# Patient Record
Sex: FEMALE | Race: OTHER
[De-identification: names, ages, dates, MRNs, and addresses within clinical notes are randomized per-mention and may not be internally consistent; named-entity substitution may affect disease eponyms.]

---

## 2021-01-06 ENCOUNTER — HOSPITAL ENCOUNTER (OUTPATIENT)
Age: 56
End: 2021-01-06
Payer: COMMERCIAL

## 2021-01-06 DIAGNOSIS — Z20.828: Primary | ICD-10-CM

## 2021-01-06 PROCEDURE — U0002 COVID-19 LAB TEST NON-CDC: HCPCS

## 2021-01-06 PROCEDURE — 87635 SARS-COV-2 COVID-19 AMP PRB: CPT

## 2021-01-06 PROCEDURE — 87633 RESP VIRUS 12-25 TARGETS: CPT

## 2021-01-07 ENCOUNTER — HOSPITAL ENCOUNTER (OUTPATIENT)
Dept: HOSPITAL 100 - MS2OUT | Age: 56
Discharge: HOME | End: 2021-01-07
Payer: COMMERCIAL

## 2021-01-07 VITALS — BODY MASS INDEX: 24.2 KG/M2

## 2021-01-07 VITALS
TEMPERATURE: 99.2 F | OXYGEN SATURATION: 97 % | HEART RATE: 112 BPM | SYSTOLIC BLOOD PRESSURE: 126 MMHG | DIASTOLIC BLOOD PRESSURE: 72 MMHG | RESPIRATION RATE: 20 BRPM

## 2021-01-07 VITALS
SYSTOLIC BLOOD PRESSURE: 129 MMHG | HEART RATE: 110 BPM | DIASTOLIC BLOOD PRESSURE: 77 MMHG | OXYGEN SATURATION: 99 % | TEMPERATURE: 97.88 F | RESPIRATION RATE: 16 BRPM

## 2021-01-07 VITALS
RESPIRATION RATE: 20 BRPM | HEART RATE: 112 BPM | OXYGEN SATURATION: 98 % | SYSTOLIC BLOOD PRESSURE: 136 MMHG | TEMPERATURE: 99.32 F | DIASTOLIC BLOOD PRESSURE: 71 MMHG

## 2021-01-07 VITALS
RESPIRATION RATE: 18 BRPM | DIASTOLIC BLOOD PRESSURE: 80 MMHG | HEART RATE: 110 BPM | OXYGEN SATURATION: 98 % | SYSTOLIC BLOOD PRESSURE: 141 MMHG | TEMPERATURE: 99.14 F

## 2021-01-07 VITALS — TEMPERATURE: 99.5 F

## 2021-01-07 VITALS
HEART RATE: 115 BPM | TEMPERATURE: 100.04 F | OXYGEN SATURATION: 99 % | DIASTOLIC BLOOD PRESSURE: 79 MMHG | RESPIRATION RATE: 20 BRPM | SYSTOLIC BLOOD PRESSURE: 142 MMHG

## 2021-01-07 DIAGNOSIS — U07.1: Primary | ICD-10-CM

## 2021-01-07 DIAGNOSIS — E11.9: ICD-10-CM

## 2021-01-07 PROCEDURE — M0239 BAMLANIVIMAB-XXXX INFUSION: HCPCS

## 2021-01-07 PROCEDURE — 96365 THER/PROPH/DIAG IV INF INIT: CPT

## 2021-01-18 ENCOUNTER — HOSPITAL ENCOUNTER (OUTPATIENT)
Age: 56
End: 2021-01-18
Payer: COMMERCIAL

## 2021-01-18 VITALS — BODY MASS INDEX: 24.2 KG/M2

## 2021-01-18 DIAGNOSIS — U07.1: Primary | ICD-10-CM

## 2021-01-18 PROCEDURE — 87635 SARS-COV-2 COVID-19 AMP PRB: CPT

## 2021-01-18 PROCEDURE — U0002 COVID-19 LAB TEST NON-CDC: HCPCS

## 2021-02-11 ENCOUNTER — HOSPITAL ENCOUNTER (OUTPATIENT)
Age: 56
End: 2021-02-11
Payer: COMMERCIAL

## 2021-02-11 VITALS — BODY MASS INDEX: 24.2 KG/M2

## 2021-02-11 DIAGNOSIS — R19.7: Primary | ICD-10-CM

## 2021-02-11 PROCEDURE — 83630 LACTOFERRIN FECAL (QUAL): CPT

## 2021-02-11 PROCEDURE — 87209 SMEAR COMPLEX STAIN: CPT

## 2021-02-11 PROCEDURE — 87493 C DIFF AMPLIFIED PROBE: CPT

## 2021-02-11 PROCEDURE — 87506 IADNA-DNA/RNA PROBE TQ 6-11: CPT

## 2021-02-11 PROCEDURE — 82274 ASSAY TEST FOR BLOOD FECAL: CPT

## 2021-02-11 PROCEDURE — 87177 OVA AND PARASITES SMEARS: CPT

## 2021-05-19 ENCOUNTER — HOSPITAL ENCOUNTER (OUTPATIENT)
Age: 56
End: 2021-05-19
Payer: COMMERCIAL

## 2021-05-19 VITALS — BODY MASS INDEX: 24.2 KG/M2

## 2021-05-19 DIAGNOSIS — M79.609: Primary | ICD-10-CM

## 2021-05-19 PROCEDURE — 73130 X-RAY EXAM OF HAND: CPT

## 2023-02-25 RX ORDER — SITAGLIPTIN AND METFORMIN HYDROCHLORIDE 50; 500 MG/1; MG/1
TABLET, FILM COATED, EXTENDED RELEASE ORAL DAILY
COMMUNITY
End: 2023-04-10 | Stop reason: SINTOL

## 2023-02-25 RX ORDER — LOSARTAN POTASSIUM 50 MG/1
50 TABLET ORAL DAILY
COMMUNITY
End: 2023-07-31 | Stop reason: DRUGHIGH

## 2023-02-25 RX ORDER — DAPAGLIFLOZIN 10 MG/1
TABLET, FILM COATED ORAL EVERY MORNING
COMMUNITY
End: 2024-03-11 | Stop reason: ALTCHOICE

## 2023-02-25 RX ORDER — INSULIN GLARGINE 300 U/ML
14 INJECTION, SOLUTION SUBCUTANEOUS NIGHTLY
COMMUNITY

## 2023-02-25 RX ORDER — ROSUVASTATIN CALCIUM 5 MG/1
5 TABLET, COATED ORAL DAILY
COMMUNITY
End: 2023-07-31 | Stop reason: DRUGHIGH

## 2023-02-25 RX ORDER — METOPROLOL SUCCINATE 50 MG/1
50 TABLET, EXTENDED RELEASE ORAL DAILY
COMMUNITY
End: 2023-07-31 | Stop reason: SINTOL

## 2023-03-27 ENCOUNTER — APPOINTMENT (OUTPATIENT)
Dept: PRIMARY CARE | Facility: CLINIC | Age: 58
End: 2023-03-27
Payer: COMMERCIAL

## 2023-03-29 ENCOUNTER — TELEPHONE (OUTPATIENT)
Dept: PRIMARY CARE | Facility: CLINIC | Age: 58
End: 2023-03-29

## 2023-04-10 ENCOUNTER — OFFICE VISIT (OUTPATIENT)
Dept: PRIMARY CARE | Facility: CLINIC | Age: 58
End: 2023-04-10
Payer: COMMERCIAL

## 2023-04-10 VITALS
DIASTOLIC BLOOD PRESSURE: 77 MMHG | BODY MASS INDEX: 26.65 KG/M2 | HEART RATE: 76 BPM | HEIGHT: 66 IN | SYSTOLIC BLOOD PRESSURE: 137 MMHG | RESPIRATION RATE: 18 BRPM | WEIGHT: 165.8 LBS

## 2023-04-10 DIAGNOSIS — Z79.899 MEDICATION DOSE CHANGED: ICD-10-CM

## 2023-04-10 DIAGNOSIS — E11.65 TYPE 2 DIABETES MELLITUS WITH HYPERGLYCEMIA, WITH LONG-TERM CURRENT USE OF INSULIN (MULTI): ICD-10-CM

## 2023-04-10 DIAGNOSIS — Z79.4 INSULIN-REQUIRING OR DEPENDENT TYPE II DIABETES MELLITUS (MULTI): Primary | ICD-10-CM

## 2023-04-10 DIAGNOSIS — E11.69 HYPERLIPIDEMIA ASSOCIATED WITH TYPE 2 DIABETES MELLITUS (MULTI): ICD-10-CM

## 2023-04-10 DIAGNOSIS — E11.9 INSULIN-REQUIRING OR DEPENDENT TYPE II DIABETES MELLITUS (MULTI): Primary | ICD-10-CM

## 2023-04-10 DIAGNOSIS — Z79.4 TYPE 2 DIABETES MELLITUS WITH HYPERGLYCEMIA, WITH LONG-TERM CURRENT USE OF INSULIN (MULTI): ICD-10-CM

## 2023-04-10 DIAGNOSIS — E78.5 HYPERLIPIDEMIA ASSOCIATED WITH TYPE 2 DIABETES MELLITUS (MULTI): ICD-10-CM

## 2023-04-10 DIAGNOSIS — E27.8 ADRENAL NODULE (MULTI): ICD-10-CM

## 2023-04-10 DIAGNOSIS — E11.319 DIABETIC RETINOPATHY ASSOCIATED WITH TYPE 2 DIABETES MELLITUS, MACULAR EDEMA PRESENCE UNSPECIFIED, UNSPECIFIED LATERALITY, UNSPECIFIED RETINOPATHY SEVERITY (MULTI): ICD-10-CM

## 2023-04-10 DIAGNOSIS — R79.9 ELEVATED BUN: ICD-10-CM

## 2023-04-10 DIAGNOSIS — E55.9 VITAMIN D INSUFFICIENCY: ICD-10-CM

## 2023-04-10 PROCEDURE — 99215 OFFICE O/P EST HI 40 MIN: CPT | Performed by: INTERNAL MEDICINE

## 2023-04-10 PROCEDURE — 3075F SYST BP GE 130 - 139MM HG: CPT | Performed by: INTERNAL MEDICINE

## 2023-04-10 PROCEDURE — 1036F TOBACCO NON-USER: CPT | Performed by: INTERNAL MEDICINE

## 2023-04-10 PROCEDURE — 3078F DIAST BP <80 MM HG: CPT | Performed by: INTERNAL MEDICINE

## 2023-04-10 PROCEDURE — 4010F ACE/ARB THERAPY RXD/TAKEN: CPT | Performed by: INTERNAL MEDICINE

## 2023-04-10 RX ORDER — SITAGLIPTIN AND METFORMIN HYDROCHLORIDE 500; 50 MG/1; MG/1
1 TABLET, FILM COATED ORAL
Qty: 60 TABLET | Refills: 2 | Status: SHIPPED | OUTPATIENT
Start: 2023-04-10 | End: 2023-07-31 | Stop reason: SINTOL

## 2023-04-10 RX ORDER — BLOOD-GLUCOSE METER
EACH MISCELLANEOUS
COMMUNITY
Start: 2023-01-17

## 2023-04-10 RX ORDER — LANCETS 33 GAUGE
EACH MISCELLANEOUS
COMMUNITY
Start: 2023-01-14

## 2023-04-10 RX ORDER — BLOOD-GLUCOSE SENSOR
EACH MISCELLANEOUS
COMMUNITY
Start: 2023-04-01

## 2023-04-10 RX ORDER — DEXAMETHASONE 1 MG/1
TABLET ORAL
COMMUNITY
Start: 2023-01-19 | End: 2023-04-10 | Stop reason: ALTCHOICE

## 2023-04-10 SDOH — HEALTH STABILITY: PHYSICAL HEALTH: ON AVERAGE, HOW MANY DAYS PER WEEK DO YOU ENGAGE IN MODERATE TO STRENUOUS EXERCISE (LIKE A BRISK WALK)?: 4 DAYS

## 2023-04-10 SDOH — ECONOMIC STABILITY: FOOD INSECURITY: WITHIN THE PAST 12 MONTHS, THE FOOD YOU BOUGHT JUST DIDN'T LAST AND YOU DIDN'T HAVE MONEY TO GET MORE.: NEVER TRUE

## 2023-04-10 SDOH — HEALTH STABILITY: PHYSICAL HEALTH: ON AVERAGE, HOW MANY MINUTES DO YOU ENGAGE IN EXERCISE AT THIS LEVEL?: 60 MIN

## 2023-04-10 SDOH — ECONOMIC STABILITY: HOUSING INSECURITY
IN THE LAST 12 MONTHS, WAS THERE A TIME WHEN YOU DID NOT HAVE A STEADY PLACE TO SLEEP OR SLEPT IN A SHELTER (INCLUDING NOW)?: NO

## 2023-04-10 SDOH — ECONOMIC STABILITY: TRANSPORTATION INSECURITY
IN THE PAST 12 MONTHS, HAS THE LACK OF TRANSPORTATION KEPT YOU FROM MEDICAL APPOINTMENTS OR FROM GETTING MEDICATIONS?: NO

## 2023-04-10 SDOH — ECONOMIC STABILITY: FOOD INSECURITY: WITHIN THE PAST 12 MONTHS, YOU WORRIED THAT YOUR FOOD WOULD RUN OUT BEFORE YOU GOT MONEY TO BUY MORE.: NEVER TRUE

## 2023-04-10 SDOH — ECONOMIC STABILITY: INCOME INSECURITY: IN THE LAST 12 MONTHS, WAS THERE A TIME WHEN YOU WERE NOT ABLE TO PAY THE MORTGAGE OR RENT ON TIME?: NO

## 2023-04-10 SDOH — ECONOMIC STABILITY: HOUSING INSECURITY: IN THE LAST 12 MONTHS, HOW MANY PLACES HAVE YOU LIVED?: 1

## 2023-04-10 SDOH — ECONOMIC STABILITY: TRANSPORTATION INSECURITY
IN THE PAST 12 MONTHS, HAS LACK OF TRANSPORTATION KEPT YOU FROM MEETINGS, WORK, OR FROM GETTING THINGS NEEDED FOR DAILY LIVING?: NO

## 2023-04-10 ASSESSMENT — SOCIAL DETERMINANTS OF HEALTH (SDOH)
DO YOU BELONG TO ANY CLUBS OR ORGANIZATIONS SUCH AS CHURCH GROUPS UNIONS, FRATERNAL OR ATHLETIC GROUPS, OR SCHOOL GROUPS?: YES
WITHIN THE LAST YEAR, HAVE YOU BEEN KICKED, HIT, SLAPPED, OR OTHERWISE PHYSICALLY HURT BY YOUR PARTNER OR EX-PARTNER?: NO
WITHIN THE LAST YEAR, HAVE YOU BEEN AFRAID OF YOUR PARTNER OR EX-PARTNER?: NO
HOW OFTEN DO YOU ATTENT MEETINGS OF THE CLUB OR ORGANIZATION YOU BELONG TO?: 1 TO 4 TIMES PER YEAR
WITHIN THE LAST YEAR, HAVE YOU BEEN HUMILIATED OR EMOTIONALLY ABUSED IN OTHER WAYS BY YOUR PARTNER OR EX-PARTNER?: NO
HOW OFTEN DO YOU ATTEND CHURCH OR RELIGIOUS SERVICES?: MORE THAN 4 TIMES PER YEAR
HOW HARD IS IT FOR YOU TO PAY FOR THE VERY BASICS LIKE FOOD, HOUSING, MEDICAL CARE, AND HEATING?: NOT HARD AT ALL
IN A TYPICAL WEEK, HOW MANY TIMES DO YOU TALK ON THE PHONE WITH FAMILY, FRIENDS, OR NEIGHBORS?: TWICE A WEEK
WITHIN THE LAST YEAR, HAVE TO BEEN RAPED OR FORCED TO HAVE ANY KIND OF SEXUAL ACTIVITY BY YOUR PARTNER OR EX-PARTNER?: NO
HOW OFTEN DO YOU GET TOGETHER WITH FRIENDS OR RELATIVES?: ONCE A WEEK

## 2023-04-10 ASSESSMENT — LIFESTYLE VARIABLES
HOW OFTEN DURING THE LAST YEAR HAVE YOU FAILED TO DO WHAT WAS NORMALLY EXPECTED FROM YOU BECAUSE OF DRINKING: NEVER
HOW OFTEN DURING THE LAST YEAR HAVE YOU HAD A FEELING OF GUILT OR REMORSE AFTER DRINKING: NEVER
SKIP TO QUESTIONS 9-10: 1
HAVE YOU OR SOMEONE ELSE BEEN INJURED AS A RESULT OF YOUR DRINKING: NO
HAS A RELATIVE, FRIEND, DOCTOR, OR ANOTHER HEALTH PROFESSIONAL EXPRESSED CONCERN ABOUT YOUR DRINKING OR SUGGESTED YOU CUT DOWN: NO
HOW OFTEN DURING THE LAST YEAR HAVE YOU BEEN UNABLE TO REMEMBER WHAT HAPPENED THE NIGHT BEFORE BECAUSE YOU HAD BEEN DRINKING: NEVER
AUDIT TOTAL SCORE: 1
HOW OFTEN DO YOU HAVE SIX OR MORE DRINKS ON ONE OCCASION: NEVER
HOW OFTEN DURING THE LAST YEAR HAVE YOU NEEDED AN ALCOHOLIC DRINK FIRST THING IN THE MORNING TO GET YOURSELF GOING AFTER A NIGHT OF HEAVY DRINKING: NEVER
HOW OFTEN DO YOU HAVE A DRINK CONTAINING ALCOHOL: MONTHLY OR LESS
HOW OFTEN DURING THE LAST YEAR HAVE YOU FOUND THAT YOU WERE NOT ABLE TO STOP DRINKING ONCE YOU HAD STARTED: NEVER
AUDIT-C TOTAL SCORE: 1
HOW MANY STANDARD DRINKS CONTAINING ALCOHOL DO YOU HAVE ON A TYPICAL DAY: 1 OR 2

## 2023-04-10 ASSESSMENT — PATIENT HEALTH QUESTIONNAIRE - PHQ9
1. LITTLE INTEREST OR PLEASURE IN DOING THINGS: NOT AT ALL
SUM OF ALL RESPONSES TO PHQ9 QUESTIONS 1 & 2: 0
2. FEELING DOWN, DEPRESSED OR HOPELESS: NOT AT ALL

## 2023-04-10 NOTE — PATIENT INSTRUCTIONS
Change janumet xr to janumet plain, 50mg-500mg one twice per day with food. If tolerated, after a month would consider increasing to janumet 50mg-1000 mg alternating with 50mg-500mg twice per day with food.  If this medication, at  dose you tolerate, does not get the a1c/sugars down, then suggest stopping it and switching to Trulicity.    Continue the Farxiga and the insulin at the reduced dose.    CT cardiac in June as planned.  Recommend aldosterone level and overnight dexamethasone suppression test at your convenience.    Call if issues with meds above and consideration with trying trulicity.    Staying hydrated is important for many bodily functions. Generally consuming 48-64 ounces of water per day is recommended.    Follow up in 3 months.  Message me about issues with plan above or when you need the 50mg-1000mg janumet sent in.

## 2023-04-10 NOTE — PROGRESS NOTES
Subjective   Patient ID: Allyson Garcia is a 58 y.o. female who presents for Follow-up (Recheck B/P, Rx's, dexamethasone supp test review (did not obtain), mammogram review(did not obtain), CT adrenal review (done at [facility] 31 March 2023.)/).  LAST VISIT PLAN 2/20/23  Patient Discussion/Summary    MEDICATIONS:  Trial Janumet 50-500mg one per day with food.  if tolerated, after 2 weeks increase to janumet 100mg-1000mg daily.    Continue farxiga  Continue insulin  Watch sugars for lows when on the janumet.    INSTRUCTIONS:  You are encouraged to drink 64 oz of water per day.     A diet that is low in sugars, refined grains and processed foods is recommended.  we talked about protein before or with carbs simultaneous at a meal, avoid carb without a protein     Exercise at least 30 minutes per day is also recommended.    Get a dexcom G7 set up to track sugars.    TESTING:fasting blood test today.    overnight dexamethasone suppression test in a week or so  consider b12 injection in future.    mammogram [facility] in the next couple months    CT adrenal gland in march  ct cardiac soon  REFERRALS:keep eye appts.    FOLLOW UP WITH DR. IVEY:  Next visit:1-2 months.  will re discuss bp meds then, consider increasing losartan to 100mg.  Provider Impressions    Delightful nephrologist with mutliple medical issues:    htn-bp not at goal here today but is stressful today, better at home, no med changes. consider increasing losartan but hold off for now    dm2 with hld, hyperglycemia a1c 8.0 and retinopathy and fearful of vision loss-ck labs. add janumet and see if can tolerate some metformin with januvia.  consider trulicity, had s/e other glp agonists.  continue farxiga.  recommend dexcom 6 (or 7)   declined dietician. discussed how to avoid sugar spikes, eat protein first, then carb. always protein with carbs    stress depression, reminded to follow up psychology, note reviewed with permission  consider meds  "?wellbutrin?? or ssri-briefly discussed: \"my  says i should\" agreed to revisit.  call if needs name change on rx to Jack    hld is on statin    adrenal nodule: ct with and without contrast never done so ordered now, she said she would do in march.assuming it is still present she needs a full work up: aldosterone, renins catecholamines etc.   she requested over night dex supp test, we did not discuss the other labs yet.    hx covid, likely aggravated her dm2 (dm2 since 2014 but worse more recent).  she feels fully recovered, but    abnl lung ct: had extensive ground glass opacities on ct when had covid pneumonia (she was hypoxic at home ,d dimer up then).-discussed follow up ct chest vs do ct for cac and she opted for the latter.  Discussed with the patient the pros and cons of doing a ct for cardiac calcium scoring. Pros are to look for possible plaque and this picks up calcified plaque, but not the less common non calcified plaque. It can breed other tests if lung nodules or liver nodules are found and need further eval. This test is not covered by insurance but any testing needed for findings noted on the ct cardiac would be billable to insurance. Radiation is low dose, there is no contrast. It can  other helpful findings such as thoracic aneurysm.    radicular pain left arm-neg adsons test. no meds rxd for this today. some left leg too? neuropathy?neck? other. no weakness.  comes and goes.  addendum : labs in   a1c 7.7  lipids reasonably good  cmp ok  cbc ok  tsh and cortisol nl. she requested overnight dex supp test.   [facility] re labs are in, did not eave details.    current phone number in chart is not correct. i used a cell number i had for her  END INFO FROM PRIOR VISIT  HPI  Here for follow up on dm2 insulin requiring and labs.  Dexcom sugars are still running high.  If goes out to eat or eats ramen, or rice.  Reviewed labs. Only pending one is dex supp test overnight, she has the decadron, " just has not been able to do the test.  Looked at her glucose monitor, see below.  Scribe Transcription:  She reports having frequent constipation due to lack of hydration. She admits she has to drink more water.   She states she doesn’t like drinking water.    She reports GI upset on GLP1A. She still has her gallbladder.     Her avg glucose 30 days is 182. 14 days, 187; 7 days, 179. 56% in range. No low glucose events. Her very highs are 5 to 10% of the time. 39% high.       Review of Systems    Objective   Feb 2023:  Cbc nl  Cmp ok other than mild dehydration, bun 30  Chol 196, trig 166, ldl 105  Am cortisol ok at 12.7, has not done over night dex test yet. Has meds.  , tsh ok, b12 nl. Vit d low. A1c7.7% which was better than she thought it would be.    DATE OF EXAM: Mar 31 2023  9:32AM      [facility] [place]  -  CT ADRENAL WO/W IVCON  / ACCESSION #  [id number]    PROCEDURE REASON: E27.8  ADRENAL NODULE        * * * * Physician Interpretation * * * *     CT ABDOMEN / ADRENAL GLANDS WITHOUT AND WITH IV CONTRAST    CLINICAL HISTORY: Left adrenal nodule  TECHNIQUE: Thin section spiral imaging through the adrenal glands was  performed without and with contrast using washout technique. Oral  contrast is not administered per protocol.    Contrast:  IV:  100 ml of Omnipaque 350  Oral Contrast: None    CT Radiation dose: Integrated Dose-length product (DLP) for this visit =    500 mGy*cm.  CT Dose Reduction Employed: Automated exposure control(AEC) and iterative  recon    COMPARISON: CT scans of the chest of 01/11/2021    RESULT:    Adrenal glands:    Right adrenal gland: No nodules, masses or thickening.    Left adrenal gland: Low density left adrenal nodule with peripheral  calcification measures 2.5 x 2.5 cm.  Slightly higher than fluid density.   No enhancement.  This is stable in size and appearance for greater than  2 years    Abdomen  Liver: No mass.    Biliary: No bile duct dilation.  The gallbladder is  "unremarkable    Spleen: No mass. No splenomegaly.    Pancreas: No mass or duct dilation.    Kidneys: 1 cm right renal angiomyolipoma.  No suspicious renal lesion or  gross obstructive uropathy.    GI tract: No dilation or wall thickening.    Lymph nodes: No abdominal lymphadenopathy.    Mesentery/Peritoneum: No ascites or mass.    Vasculature:  The celiac axis and SMA are patent. The portal vein and  branches, splenic vein, SMV, and hepatic veins are patent.  The aorta is  normal in caliber    Bones/Soft Tissues: No significant finding.    Lower Thorax: Unremarkable.    Exam End: --     Physical ExamBP 137/77   Pulse 76   Resp 18   Ht 1.676 m (5' 6\")   Wt 75.2 kg (165 lb 12.8 oz)   BMI 26.76 kg/m²   Patient looks well and is in no obvious distress.  HEENT:   Normocephalic, no facial asymmetry  Eyes: Sclera and conjunctiva are clear.  Neck: No adenopathy cervical (Ant/post/lat), no Supraclavicular nodes.   Thyroid normal.   Carotid pulses normal, no carotid bruits.  Lungs : RR normal. Clear to auscultation anterior, posterior and lateral. No rales, wheezes rhonchi or rubs. Good air exchange.  Heart: RRR. No Murmur, gallop, click or rub.  Abdomen: Bowel sounds normal, No bruits. No pulsatile mass. No hepatosplenomegaly, masses or tenderness. Soft, no guarding.  Vascular:  Posterior tibialis and dorsalis pedis pulses within normal limits bilaterally.   Extremities: no upper extremity edema. No lower extremity edema.   Musculoskeletal: no synovitis of joints seen. No new deformity.  Neuro: CN 2-12 intact. Alert, appropriate.   Ambulates independently.  No gross motor deficit.   No tremors.  Psych: normal mood and affect.  Skin: No rash, bruising petechiae or jaundice.    Monofilament testing normal  Foot exam normal, skin normal no callus          Assessment/Plan   Problem List Items Addressed This Visit       Type 2 diabetes mellitus with hyperglycemia, with long-term current use of insulin (CMS/[facility])  Continue " farxiga and insulin -hx of Rybelsus gi side effect and ozempic gi side effect. Victoza gi side effect nausea. Bydureon gi side effect. Could still try trulicity or monjaro and recommend doing so.  Would need to change janumet to just metformin if we do this and she has hx of not tolerating even small doses of plain metformin. Gi s/e. She wants to try and maximize jannumet and I am ok with this but if does not work then need to try the trulicity or monjaro      Vitamin D insufficiency replace    Elevated BUN drink more water    Adrenal nodule (CMS/HCC)   needs work up metabolic .start with overnight dex supp test.  And should do aldosterone level.     Other Visit Diagnoses       Insulin-requiring or dependent type II diabetes mellitus (CMS/HCC)    -  Primary    Relevant Medications    Janumet  mg tablet    Hyperlipidemia associated with type 2 diabetes mellitus (CMS/HCC)       Should be on statin just because of dm and has retinopathy. Will do ct cardiac in June  Retinopathy dm-seeing retinal specialist.            It has been a pleasure seeing you.

## 2023-04-22 PROBLEM — R79.9 ELEVATED BUN: Status: ACTIVE | Noted: 2023-04-22

## 2023-04-22 PROBLEM — E11.65 TYPE 2 DIABETES MELLITUS WITH HYPERGLYCEMIA, WITH LONG-TERM CURRENT USE OF INSULIN (MULTI): Status: ACTIVE | Noted: 2023-04-22

## 2023-04-22 PROBLEM — Z79.4 TYPE 2 DIABETES MELLITUS WITH HYPERGLYCEMIA, WITH LONG-TERM CURRENT USE OF INSULIN (MULTI): Status: ACTIVE | Noted: 2023-04-22

## 2023-04-22 PROBLEM — E27.9 ADRENAL NODULE: Status: ACTIVE | Noted: 2023-04-22

## 2023-04-22 PROBLEM — E27.8 ADRENAL NODULE (MULTI): Status: ACTIVE | Noted: 2023-04-22

## 2023-04-22 PROBLEM — E55.9 VITAMIN D INSUFFICIENCY: Status: ACTIVE | Noted: 2023-04-22

## 2023-06-05 DIAGNOSIS — E78.5 HYPERLIPIDEMIA ASSOCIATED WITH TYPE 2 DIABETES MELLITUS (MULTI): ICD-10-CM

## 2023-06-05 DIAGNOSIS — E11.65 TYPE 2 DIABETES MELLITUS WITH HYPERGLYCEMIA, WITH LONG-TERM CURRENT USE OF INSULIN (MULTI): ICD-10-CM

## 2023-06-05 DIAGNOSIS — E11.69 HYPERLIPIDEMIA ASSOCIATED WITH TYPE 2 DIABETES MELLITUS (MULTI): ICD-10-CM

## 2023-06-05 DIAGNOSIS — Z79.4 TYPE 2 DIABETES MELLITUS WITH HYPERGLYCEMIA, WITH LONG-TERM CURRENT USE OF INSULIN (MULTI): ICD-10-CM

## 2023-06-05 DIAGNOSIS — R93.1 HIGH CORONARY ARTERY CALCIUM SCORE: Primary | ICD-10-CM

## 2023-06-05 DIAGNOSIS — E11.319 DIABETIC RETINOPATHY ASSOCIATED WITH TYPE 2 DIABETES MELLITUS, MACULAR EDEMA PRESENCE UNSPECIFIED, UNSPECIFIED LATERALITY, UNSPECIFIED RETINOPATHY SEVERITY (MULTI): ICD-10-CM

## 2023-06-05 DIAGNOSIS — I25.10 CORONARY ARTERY DISEASE INVOLVING NATIVE CORONARY ARTERY OF NATIVE HEART WITHOUT ANGINA PECTORIS: ICD-10-CM

## 2023-07-17 LAB
CALCIDIOL (25 OH VITAMIN D3) (NG/ML) IN SER/PLAS: 34 NG/ML
CORTISOL (UG/DL) IN SERUM - AM: 1.2 UG/DL (ref 5–20)
ESTIMATED AVERAGE GLUCOSE FOR HBA1C: 174 MG/DL
HEMOGLOBIN A1C/HEMOGLOBIN TOTAL IN BLOOD: 7.7 %

## 2023-07-21 NOTE — PROGRESS NOTES
"Subjective   Patient ID: Allyson Garcia is a 58 y.o. female who presents for Follow-up (Follow up Rx's 3 month./She stopped metoprolol and vit D./She's taking Janumet 50-1,000/\"I'm not taking Tujeo every day./[facility] [place] / [employer] [place]./RF rosuvastatin (10mg) to RA [place]./I cut lisinopril back to 25mg/d; I get dizzy with the 50mg.\"/mary).  LAST VISIT PLAN 4/10/2023  PATIENT'S DISCUSSION/SUMMARY:  Change janumet xr to janumet plain, 50mg-500mg one twice per day with food. If tolerated, after a month would consider increasing to janumet 50mg-1000 mg alternating with 50mg-500mg twice per day with food.  If this medication, at  dose you tolerate, does not get the a1c/sugars down, then suggest stopping it and switching to Trulicity.  Continue the Farxiga and the insulin at the reduced dose.  CT cardiac in June as planned.  Recommend aldosterone level and overnight dexamethasone suppression test at your convenience.  Call if issues with meds above and consideration with trying trulicity.  Staying hydrated is important for many bodily functions. Generally consuming 48-64 ounces of water per day is recommended.  Follow up in 3 months.  Message me about issues with plan above or when you need the 50mg-1000mg janumet sent in.      PROVIDER'S IMPRESSIONS:  Insulin-requiring or dependent type II diabetes mellitus (CMS/HCC)  Adrenal nodule (CMS/HCC)  Type 2 diabetes mellitus with hyperglycemia, with long-term current use of insulin (CMS/HCC)  Hyperlipidemia associated with type 2 diabetes mellitus (CMS/HCC)  Vitamin D insufficiency  Elevated BUN  Diabetic retinopathy associated with type 2 diabetes mellitus, macular edema presence unspecified, unspecified laterality, unspecified retinopathy severity (CMS/HCC)  Medication dose changed  Medication Changes  sitagliptin phos/metformin HCl  mg tablet, 1 tablet oral 2 times daily with meals   END INFO FROM PRIOR VISIT    HPI  [id number] requiring with " hyperglycemia here forfollow up.Meds are  janumet, farxiga and insulin.  She has retinopathy and mildly abnormal renal labs    This am had  toast with cheese and chicken salad and sugar in her instant coffee. Has dexcom.    Pt discontinued Her metoprolol and cut her losartan to 25mg daily due to low bp and feeling tired.    She is not taking toujaeo every day. Says she bottoms out to 100 sometimes.A1c is not at goal.    Early am sugars 190-200   She is Maxed out on metformin and januvia.dose/tolerability wise. Says she does not like metformin as is dangerous and she has seen acute renal failure in patients on metformin.Reviewed kdigo guidelines on metformin that recommend .keep it unless gfr below 30/  Recommend she see an endocrinologist. She declines. Seems not easy for her to do at her location.       Is fearful of trulicity. Had n/v/d ozempic and rybelsus but says that she is worred about slow gut from the diabetes.  Has never had egd.  Occ gets reflux if bloated.  Still has gb.  Bloated.    She does not want a hida scan to investigate gb, imaging of gb ok on adrenal gland ct.    Vitamin D: She was taking 1000iu international  d and level 34 on this  dose so she stopped it.recommend checking a level in the future as may be low if not taking it.    Scribe Transcription:  She reports feeling bloated when she eats normally and is hesitant to try Rybelsus. She states she doesn’t take Tujeo everyday and only takes it when she has a higher carb diet. She takes it at night to keep her morning sugars down. She reports taking her Tujeo 2-3 times per week.     She reports some acid reflux.     She sounds unhappy/frustrated with her illness today and she states she is tired of taking all her medications and feels like she is “chasing numbers.”     90 day glucose summary: 187 avg glucose, 48% in range, 51% high, less than 1% low.  30 day: 192 avg, 45% in range, no lows.   14 days: 191 avg glucose, 43% in range, 57% high, no  "lows.     Diabetes:Type II:Is taking medications regularly, denies side effects.  Denies hypoglycemia, polyuria, polydipsia.  No new numbness or paresthesias of extremities.No syncope or dizziness.  Lab Results   Component Value Date    HGBA1C 7.7 (A) 07/17/2023            We had a long discussion regarding taking her Tujeo daily because she has had no low sugar events. We discussed her desire too hold her metformin to avoid any potential hypoglycemic events.     She denies numbness and tingling.    None of her February labs ported over to Epic.     Scribe Attestation  By signing my name below, I, Kp Lin   attest that this documentation has been prepared under the direction and in the presence of Parris Knowles MD.     Review of Systems  Has elevated coronary art calcium score. She is not sure she needs to see cardio but will keep the appt.   Discussed crestor, is on 10mg will need labs on this.  No cardio resp c/o  Objective   Lab Results   Component Value Date    HGBA1C 7.7 (A) 07/17/2023     Physical ExamBP 126/87   Pulse 94   Ht 1.676 m (5' 6\")   Wt 73.9 kg (163 lb)   BMI 26.31 kg/m²       2/20/2023     8:17 AM 2/20/2023     9:53 AM 4/10/2023     4:25 PM 4/10/2023     4:30 PM 7/31/2023    10:06 AM 7/31/2023    10:09 AM   Vitals   Systolic 152 146 131 137 154 126   Diastolic 86 86 86 77 99 87   Heart Rate 64  90 76 99 94   Resp 18  18      Height (in) 1.683 m (5' 6.25\")  1.676 m (5' 6\")  1.676 m (5' 6\")    Weight (lb) 162  165.8  163    BMI 25.95 kg/m2  26.76 kg/m2  26.31 kg/m2    BSA (m2) 1.85 m2  1.87 m2  1.86 m2    Visit Report   Report Report Report Report   Usually bp 120-130 systolic and rarely 150.      Patient looks well and is in no obvious distress.  HEENT:   Normocephalic, no facial asymmetry  Eyes: Sclera and conjunctiva are clear.  Neck: No adenopathy cervical (Ant/post/lat), no Supraclavicular nodes.   Thyroid normal.  Carotid pulses normal, no carotid bruits.  Lungs : RR " normal. Clear to auscultation anterior, posterior and lateral. No rales, wheezes rhonchi or rubs. Good air exchange.  Heart: RRR. No Murmur, gallop, click or rub.  Abdomen: Bowel sounds normal, No bruits. No pulsatile mass. No hepatosplenomegaly, masses or tenderness. Soft, no guarding.  Vascular:  Posterior tibialis pulses within normal limits bilaterally.   Extremities: no upper extremity edema. No lower extremity edema.   Musculoskeletal: no synovitis of joints seen. No new deformity.  Neuro: CN 2-12 intact. Alert, appropriate.  Ambulates independently.  No gross motor deficit.   No tremors.  Psych: normal mood and affect.  Skin: No rash, bruising petechiae or jaundice.    Assessment/Plan   Problem List Items Addressed This Visit       Type 2 diabetes mellitus with hyperglycemia, with long-term current use of insulin (CMS/[facility])    Relevant Medications    metFORMIN (Glucophage) 1,000 mg tablet    dulaglutide (Trulicity) 0.75 mg/0.5 mL pen injector    Other Relevant Orders    Comprehensive metabolic panel    Lipid panel    Vitamin D insufficiency   pt stopped her vitamin d since her level was 34. Will michele in future as not sure it will stay in normal range if no supplement.    Hypertension   stable on michele-pt stopped her metoprolol and decreased losartan to 25mg due to low bp/fatigue at home.     Other Visit Diagnoses       Abdominal bloating    -  see if med changes help this. Consider gi eval.hida scan declined. Pt has concerns about slow gut from dm2.    Hyperlipidemia associated with type 2 diabetes mellitus (CMS/[facility])        Relevant Medications    rosuvastatin (Crestor) 10 mg tablet    Other Relevant Orders    Comprehensive metabolic panel    Lipid panel    Coronary artery calcification of native artery    -keep cardio appt. Statin with ldl goal below 70 or below 55.           Would like to get her on a glp1-should benefit her. Watch tolerability. Trulicity first, cut back on metformin and stop januvia when  starting to avoid gi upset that could cause her to stop med. Cut back on insulin as well once started, see instructions.  Referral pharm d.  If trulicity not tolerated consider mounjaro. Its mace improvement data was not out at time of kdigo but should be out soon.    Re abd bloating, would like to see if changing the janumet helps this. Cutting down on carbs would likely help.  Pt is physician and very knowledgeable, despite could consider nutrition consult for diabetes, she did not feel needed at this time.  Over 50 minutes was spent with this patient and over 50% of this time was in counseling discussion and coordination of care.

## 2023-07-31 ENCOUNTER — OFFICE VISIT (OUTPATIENT)
Dept: PRIMARY CARE | Facility: CLINIC | Age: 58
End: 2023-07-31
Payer: COMMERCIAL

## 2023-07-31 VITALS
BODY MASS INDEX: 26.2 KG/M2 | HEART RATE: 94 BPM | HEIGHT: 66 IN | WEIGHT: 163 LBS | SYSTOLIC BLOOD PRESSURE: 126 MMHG | DIASTOLIC BLOOD PRESSURE: 87 MMHG

## 2023-07-31 DIAGNOSIS — E78.5 HYPERLIPIDEMIA ASSOCIATED WITH TYPE 2 DIABETES MELLITUS (MULTI): ICD-10-CM

## 2023-07-31 DIAGNOSIS — I25.84 CORONARY ARTERY CALCIFICATION OF NATIVE ARTERY: ICD-10-CM

## 2023-07-31 DIAGNOSIS — R14.0 ABDOMINAL BLOATING: ICD-10-CM

## 2023-07-31 DIAGNOSIS — E11.69 HYPERLIPIDEMIA ASSOCIATED WITH TYPE 2 DIABETES MELLITUS (MULTI): ICD-10-CM

## 2023-07-31 DIAGNOSIS — Z79.4 TYPE 2 DIABETES MELLITUS WITH HYPERGLYCEMIA, WITH LONG-TERM CURRENT USE OF INSULIN (MULTI): Primary | ICD-10-CM

## 2023-07-31 DIAGNOSIS — E11.65 TYPE 2 DIABETES MELLITUS WITH HYPERGLYCEMIA, WITH LONG-TERM CURRENT USE OF INSULIN (MULTI): Primary | ICD-10-CM

## 2023-07-31 DIAGNOSIS — I10 PRIMARY HYPERTENSION: ICD-10-CM

## 2023-07-31 DIAGNOSIS — I25.10 CORONARY ARTERY CALCIFICATION OF NATIVE ARTERY: ICD-10-CM

## 2023-07-31 DIAGNOSIS — E55.9 VITAMIN D INSUFFICIENCY: ICD-10-CM

## 2023-07-31 PROBLEM — R91.8 GROUND GLASS OPACITY PRESENT ON IMAGING OF LUNG: Status: ACTIVE | Noted: 2021-02-20

## 2023-07-31 PROBLEM — R53.83 FATIGUE: Status: ACTIVE | Noted: 2023-07-31

## 2023-07-31 PROBLEM — M54.10 RADICULAR PAIN: Status: RESOLVED | Noted: 2023-07-31 | Resolved: 2023-07-31

## 2023-07-31 PROCEDURE — 4010F ACE/ARB THERAPY RXD/TAKEN: CPT | Performed by: INTERNAL MEDICINE

## 2023-07-31 PROCEDURE — 3079F DIAST BP 80-89 MM HG: CPT | Performed by: INTERNAL MEDICINE

## 2023-07-31 PROCEDURE — 3074F SYST BP LT 130 MM HG: CPT | Performed by: INTERNAL MEDICINE

## 2023-07-31 PROCEDURE — 1036F TOBACCO NON-USER: CPT | Performed by: INTERNAL MEDICINE

## 2023-07-31 PROCEDURE — 99215 OFFICE O/P EST HI 40 MIN: CPT | Performed by: INTERNAL MEDICINE

## 2023-07-31 PROCEDURE — 3051F HG A1C>EQUAL 7.0%<8.0%: CPT | Performed by: INTERNAL MEDICINE

## 2023-07-31 RX ORDER — ROSUVASTATIN CALCIUM 10 MG/1
10 TABLET, COATED ORAL DAILY
COMMUNITY
End: 2023-07-31 | Stop reason: DRUGHIGH

## 2023-07-31 RX ORDER — LOSARTAN POTASSIUM 50 MG/1
25 TABLET ORAL DAILY
Qty: 45 TABLET | Refills: 3 | Status: SHIPPED | OUTPATIENT
Start: 2023-07-31

## 2023-07-31 RX ORDER — ROSUVASTATIN CALCIUM 10 MG/1
10 TABLET, COATED ORAL DAILY
Qty: 90 TABLET | Refills: 3 | Status: SHIPPED | OUTPATIENT
Start: 2023-07-31

## 2023-07-31 RX ORDER — METFORMIN HYDROCHLORIDE 1000 MG/1
1000 TABLET ORAL
Qty: 90 TABLET | Refills: 3 | Status: SHIPPED | OUTPATIENT
Start: 2023-07-31 | End: 2024-03-11 | Stop reason: ALTCHOICE

## 2023-07-31 RX ORDER — DULAGLUTIDE 0.75 MG/.5ML
0.75 INJECTION, SOLUTION SUBCUTANEOUS
Qty: 2 ML | Refills: 11 | Status: SHIPPED | OUTPATIENT
Start: 2023-07-31 | End: 2024-03-11 | Stop reason: SINTOL

## 2023-07-31 ASSESSMENT — PAIN SCALES - GENERAL: PAINLEVEL: 0-NO PAIN

## 2023-07-31 NOTE — PATIENT INSTRUCTIONS
Diabetes:  Keep the Farxiga.  Stop Janumet since not that effective and we want to trial Trulicity.    Will plan on taking  metformin 1000 mg twice per day. HOWEVER: Hold metformin when you start trulicity. Hold it for 2 weeks to avoid any other potential med affecting the GI tract while we see if you tolerate truliciy. Then restart it at 500mg daily and increase by 500mg weekly until you are back up to 2000 mg daily.    Start Trulicity 0.75 mg at night once per week.  Once you start the Trulicity, cut back Toujeo by 30%.  Toujeo should be daily: decrease to 15 units or 10 units every day.  If Trulicity is not tolerated, can try Mounjaro, or can add humalog and treat the diabetes with toujeo and then humalog with meals.  We discussed endo referral and are going to hold off right now.    Keep cardiology appt.  Fasting lipid and cmp in the next month or 2 to check on crestor.    Let me know what happens with the trulicity.  Anthony is our pharm D in case you get a call from him.    Appt. Here in 3 months.

## 2023-09-08 ENCOUNTER — LAB (OUTPATIENT)
Dept: LAB | Facility: LAB | Age: 58
End: 2023-09-08
Payer: COMMERCIAL

## 2023-09-08 DIAGNOSIS — E11.69 HYPERLIPIDEMIA ASSOCIATED WITH TYPE 2 DIABETES MELLITUS (MULTI): ICD-10-CM

## 2023-09-08 DIAGNOSIS — E11.65 TYPE 2 DIABETES MELLITUS WITH HYPERGLYCEMIA, WITH LONG-TERM CURRENT USE OF INSULIN (MULTI): ICD-10-CM

## 2023-09-08 DIAGNOSIS — E78.5 HYPERLIPIDEMIA ASSOCIATED WITH TYPE 2 DIABETES MELLITUS (MULTI): ICD-10-CM

## 2023-09-08 DIAGNOSIS — Z79.4 TYPE 2 DIABETES MELLITUS WITH HYPERGLYCEMIA, WITH LONG-TERM CURRENT USE OF INSULIN (MULTI): ICD-10-CM

## 2023-09-08 PROCEDURE — 80061 LIPID PANEL: CPT

## 2023-09-08 PROCEDURE — 80053 COMPREHEN METABOLIC PANEL: CPT

## 2023-09-08 PROCEDURE — 36415 COLL VENOUS BLD VENIPUNCTURE: CPT

## 2023-09-09 LAB
ALANINE AMINOTRANSFERASE (SGPT) (U/L) IN SER/PLAS: 14 U/L (ref 7–45)
ALBUMIN (G/DL) IN SER/PLAS: 4.4 G/DL (ref 3.4–5)
ALKALINE PHOSPHATASE (U/L) IN SER/PLAS: 46 U/L (ref 33–110)
ANION GAP IN SER/PLAS: 13 MMOL/L (ref 10–20)
ASPARTATE AMINOTRANSFERASE (SGOT) (U/L) IN SER/PLAS: 15 U/L (ref 9–39)
BILIRUBIN TOTAL (MG/DL) IN SER/PLAS: 0.5 MG/DL (ref 0–1.2)
CALCIUM (MG/DL) IN SER/PLAS: 9.9 MG/DL (ref 8.6–10.6)
CARBON DIOXIDE, TOTAL (MMOL/L) IN SER/PLAS: 28 MMOL/L (ref 21–32)
CHLORIDE (MMOL/L) IN SER/PLAS: 105 MMOL/L (ref 98–107)
CHOLESTEROL (MG/DL) IN SER/PLAS: 160 MG/DL (ref 0–199)
CHOLESTEROL IN HDL (MG/DL) IN SER/PLAS: 54.6 MG/DL
CHOLESTEROL/HDL RATIO: 2.9
CREATININE (MG/DL) IN SER/PLAS: 0.69 MG/DL (ref 0.5–1.05)
GFR FEMALE: >90 ML/MIN/1.73M2
GLUCOSE (MG/DL) IN SER/PLAS: 152 MG/DL (ref 74–99)
LDL: 84 MG/DL (ref 0–99)
POTASSIUM (MMOL/L) IN SER/PLAS: 4.1 MMOL/L (ref 3.5–5.3)
PROTEIN TOTAL: 7.3 G/DL (ref 6.4–8.2)
SODIUM (MMOL/L) IN SER/PLAS: 142 MMOL/L (ref 136–145)
TRIGLYCERIDE (MG/DL) IN SER/PLAS: 107 MG/DL (ref 0–149)
UREA NITROGEN (MG/DL) IN SER/PLAS: 25 MG/DL (ref 6–23)
VLDL: 21 MG/DL (ref 0–40)

## 2023-10-04 ENCOUNTER — TELEPHONE (OUTPATIENT)
Dept: PRIMARY CARE | Facility: CLINIC | Age: 58
End: 2023-10-04
Payer: COMMERCIAL

## 2023-10-04 DIAGNOSIS — E27.8 ADRENAL NODULE (MULTI): Primary | ICD-10-CM

## 2023-10-05 NOTE — TELEPHONE ENCOUNTER
Please contact Dr Browne (Allyson Santiago):    Let her know I wrote a letter and am sending in the info to [email or web address] about her Dexcom 7    Also I reviewed dr paz's note and he was concerned that she have a metabolic work up for her adrenal adenoma. He thought she had one but per my records, the  only test she has had is a dexamethasone suppression test.    I placed orders for the traditional work up and especially the aldosterone (even though her potassium level is ok). Placed orders for blood and urine tests as well as a urine albumin.creatinine ratio (if she did one of those at her office can she fax a copy). She can look at the orders and see what she thinks.    Finally, I am aware she saw her labs, Cholesterol LDL is 84 and close to goal of under 70. Would not make any changes to the crestor right now. Would see how it is in 6  months or so.    Thanks.

## 2023-10-05 NOTE — TELEPHONE ENCOUNTER
"Dr. Browne notified of Dr. QUINTANA's note below.  She said that \"I let Dr. QUINTANA know that we will discuss labs at my NOV in November.\"  mary  "

## 2023-10-16 ENCOUNTER — HOSPITAL ENCOUNTER (OUTPATIENT)
Dept: CARDIOLOGY | Facility: CLINIC | Age: 58
Discharge: HOME | End: 2023-10-16
Payer: COMMERCIAL

## 2023-10-16 DIAGNOSIS — R06.02 SHORTNESS OF BREATH: ICD-10-CM

## 2023-10-16 DIAGNOSIS — I10 ESSENTIAL (PRIMARY) HYPERTENSION: ICD-10-CM

## 2023-10-16 PROCEDURE — 93016 CV STRESS TEST SUPVJ ONLY: CPT | Performed by: STUDENT IN AN ORGANIZED HEALTH CARE EDUCATION/TRAINING PROGRAM

## 2023-10-16 PROCEDURE — 93018 CV STRESS TEST I&R ONLY: CPT | Performed by: STUDENT IN AN ORGANIZED HEALTH CARE EDUCATION/TRAINING PROGRAM

## 2023-10-16 PROCEDURE — 93350 STRESS TTE ONLY: CPT | Performed by: STUDENT IN AN ORGANIZED HEALTH CARE EDUCATION/TRAINING PROGRAM

## 2023-10-16 PROCEDURE — 93017 CV STRESS TEST TRACING ONLY: CPT

## 2023-11-02 ENCOUNTER — TELEPHONE (OUTPATIENT)
Dept: PRIMARY CARE | Facility: CLINIC | Age: 58
End: 2023-11-02
Payer: COMMERCIAL

## 2023-11-02 NOTE — TELEPHONE ENCOUNTER
[facility] Checking status of the detailed written order for sensor that was faxed to our office on 10-.  Call back ph# = [phone].  Pt's acct # [id number]  Fax [phone]  Please advise.  mary

## 2023-11-03 ENCOUNTER — TELEPHONE (OUTPATIENT)
Dept: PRIMARY CARE | Facility: CLINIC | Age: 58
End: 2023-11-03
Payer: COMMERCIAL

## 2023-11-03 NOTE — TELEPHONE ENCOUNTER
Please call patient and remind her to have her blood work drawn before her appointment with Dr. Knowles on 11/6/23.

## 2023-11-04 NOTE — PROGRESS NOTES
Subjective   Patient ID: Allyson Santiago is a 58 y.o. female who presents for Follow-up (Pt here for 4 month follow up after some medication changes. A1c today).  LAST VISIT PLAN 7/31/23  Problem List Items Addressed This Visit         Type 2 diabetes mellitus with hyperglycemia, with long-term current use of insulin (CMS/[facility])     Relevant Medications     metFORMIN (Glucophage) 1,000 mg tablet     dulaglutide (Trulicity) 0.75 mg/0.5 mL pen injector     Other Relevant Orders     Comprehensive metabolic panel     Lipid panel     Vitamin D insufficiency   pt stopped her vitamin d since her level was 34. Will michele in future as not sure it will stay in normal range if no supplement.     Hypertension   stable on michele-pt stopped her metoprolol and decreased losartan to 25mg due to low bp/fatigue at home.      Other Visit Diagnoses         Abdominal bloating    -  see if med changes help this. Consider gi eval.hida scan declined. Pt has concerns about slow gut from dm2.     Hyperlipidemia associated with type 2 diabetes mellitus (CMS/[facility])         Relevant Medications     rosuvastatin (Crestor) 10 mg tablet     Other Relevant Orders     Comprehensive metabolic panel     Lipid panel     Coronary artery calcification of native artery    -keep cardio appt. Statin with ldl goal below 70 or below 55.          Would like to get her on a glp1-should benefit her. Watch tolerability. Trulicity first, cut back on metformin and stop januvia when starting to avoid gi upset that could cause her to stop med. Cut back on insulin as well once started, see instructions.  Referral pharm d.  If trulicity not tolerated consider mounjaro. Its mace improvement data was not out at time of kdigo but should be out soon.     Re abd bloating, would like to see if changing the janumet helps this. Cutting down on carbs would likely help.  Pt is physician and very knowledgeable, despite could consider nutrition consult for diabetes, she did not feel  needed at this time.  Over 50 minutes was spent with this patient and over 50% of this time was in counseling discussion and coordination of care.     END INFO FROM PRIOR VISIT  HPI      Trulicity only q 2 weeks. Rec either  weekly or stop.  She is taking it in the day rec at night  She is only taking it every 2 weeks.  It suppresses appetite.discussed if cannot do weekly would stop it.    Pt states she does not need a hep c test, no exposure  Also states she does not need any of the adrenal adenoma testing other than the dex supp test which has already been done. She will think about the urinary catecholamines but if so would do the 24 hour testing..she will let me know.  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  No cp palpitaiotns  Gets lightheaded if pushes it at spinning and was not happy that the stress test only took her to 111% of her mhr as she goes above that when working out and that is when she gets lightheaded. Her sugar is not low , she has her cgm. She has not checked her bp at this time  She has it happen at spinning or at tennis, not usually golf and that season is over. She walks 9 holes.  We discussed that it could be a hydrationissue.    Has a lot of fluctuations in her blood sugar. Runs very high with typical meals (rice is one thing that makes it go up ) but even asian food without rice. Can get lows, nothing severe enough to require an er visit or someone else medicating her. Knows how to handle them.  Vision remains blurred. Seeing eye doc, has retinopathy they are treating.  Discussed long acting and using the humalog kwik pen. She feels confident in managing the amount of humalog and wants to start with 10 units with meals and then adjust. I would prefer she first try 5 units and then see how she responds. Discussed carb counting to manage the amounts.   She prefers not seeing endo and will manage it herself.  She  "really needs the cgm to do this.  Her sugars fluctuate quite a bit.     Scribe Transcription:  We discussed adding Humalog kwik pen, 5-10 units based on her carb count  Labs below  Lab Results   Component Value Date    HGBA1C 8.2 (A) 11/06/2023    HGBA1C 7.7 (A) 07/17/2023     Lab Results   Component Value Date    CREATININE 0.69 09/08/2023         Lab Results   Component Value Date    GLUCOSE 152 (H) 09/08/2023    CALCIUM 9.9 09/08/2023     09/08/2023    K 4.1 09/08/2023    CO2 28 09/08/2023     09/08/2023    BUN 25 (H) 09/08/2023    CREATININE 0.69 09/08/2023         SGLT-2 inhibitors (Farxiga, Jardiance, Invokana): No hematuria, dysuria, or yeast infections.     She denies any bowel issues.    She got her flu vaccine.     She reports feeling down due to caring for her aging parent in addition to herself. Declines medication or counseling , feels she can manage it on her own. Could consider adding some wellbutrin.    Scribe Attestation  By signing my name below, I, Lucien Engle, Scribe   attest that this documentation has been prepared under the direction and in the presence of Parris Knowles MD.   Review of Systems  See hpi  No new numbness paresthesias, no syncope  No  new cp.      Current Outpatient Medications   Medication Instructions    BD Ultra-Fine Short Pen Needle 31 gauge x 5/16\" needle     dapagliflozin (Farxiga) 10 mg oral, Every morning    Dexcom G7 Sensor device APPLY DEVICE TO POSTERIOR ARM AND LEAVE FOR 10 DAYS. USE REMOTE TO SCAN AND REVIEW SUGARS    insulin lispro (HumaLOG KwikPen Insulin) 200 unit/mL (3 mL) insulin pen pen Take as directed per insulin instructions. 5-10 units with each meal based on expected carb count.    losartan (COZAAR) 25 mg, oral, Daily    metFORMIN (GLUCOPHAGE) 1,000 mg, oral, 2 times daily with meals    OneTouch Delica Plus Lancet 33 gauge misc TAKE 1 APPLICATION AS DIRECTED 3 TIMES PER DAY FOR 90 DAYS E11.9 NON INSULIN PT TESTS 3 X DAILY    OneTouch Verio " "test strips strip USE THREE TIMES DAILY    rosuvastatin (CRESTOR) 10 mg, oral, Daily    Toujeo Max U-300 SoloStar 30 Units, subcutaneous, Daily, Take as directed per insulin instructions.    Trulicity 0.75 mg, subcutaneous, Weekly     Allergies   Allergen Reactions    Metformin Other     Abdominal pain    Ozempic [Semaglutide] Diarrhea and Other     Nausea, Vomiting, diarrhea    Tresiba Flextouch U-100 [Insulin Degludec] Other     Bloating, weight gain    Victoza 2-Cleve [Liraglutide] Other     nausea     Objective   Lab Results   Component Value Date    CHOL 160 09/08/2023    TRIG 107 09/08/2023    HDL 54.6 09/08/2023    ALT 14 09/08/2023    AST 15 09/08/2023     09/08/2023    K 4.1 09/08/2023     09/08/2023    CREATININE 0.69 09/08/2023    BUN 25 (H) 09/08/2023    CO2 28 09/08/2023    HGBA1C 8.2 (A) 11/06/2023       Physical ExamBP 104/56 (BP Location: Right arm, Patient Position: Sitting, BP Cuff Size: Adult)   Pulse 84   Resp 18   Ht 1.651 m (5' 5\")   Wt 73.5 kg (162 lb)   BMI 26.96 kg/m²       2/20/2023     8:17 AM 2/20/2023     9:53 AM 4/10/2023     4:25 PM 4/10/2023     4:30 PM 7/31/2023    10:06 AM 7/31/2023    10:09 AM 11/6/2023    11:38 AM   Vitals   Systolic 152 146 131 137 154 126 104   Diastolic 86 86 86 77 99 87 56   Heart Rate 64  90 76 99 94 84   Resp 18  18    18   Height (in) 1.683 m (5' 6.25\")  1.676 m (5' 6\")  1.676 m (5' 6\")  1.651 m (5' 5\")   Weight (lb) 162  165.8  163  162   BMI 25.95 kg/m2  26.76 kg/m2  26.31 kg/m2  26.96 kg/m2   BSA (m2) 1.85 m2  1.87 m2  1.86 m2  1.84 m2   Visit Report   Report Report Report Report Report     144/80 on michele heart rate 90 bpm    Patient is well appearing and is in no obvious distress.  HEENT:   Normocephalic, no facial asymmetry  Eyes: Sclera and conjunctiva are clear.  Neck: No adenopathy cervical (Ant/post/lat), no Supraclavicular nodes.   Thyroid normal.   Carotid pulses normal, no carotid bruits.  Lungs : RR normal. Clear to auscultation " anterior, posterior and lateral. No rales, wheezes rhonchi or rubs. Good air exchange.  Heart: RRR. No Murmur, gallop, click or rub.  Abdomen: Bowel sounds normal, No bruits. No pulsatile mass. No hepatosplenomegaly, masses or tenderness. Soft, no guarding.  Vascular:  Posterior tibialis 1+and dorsalis pedis 2+ pulses within normal limits bilaterally.   Extremities: no upper extremity edema. No lower extremity edema.   Musculoskeletal: no synovitis of joints seen. No new deformity.  Neuro: CN 2-12 intact. Alert, appropriate.   Ambulates independently.  No gross motor deficit.   No tremors.  Psych: normal mood and affect-frustrated with her diabetes.  Skin: No rash, bruising petechiae or jaundice.    Allyson was seen today for follow-up.  Diagnoses and all orders for this visit:  Type 2 diabetes mellitus with hyperglycemia, with long-term current use of insulin (CMS/HCC) (Primary)  -     POCT glycosylated hemoglobin (Hb A1C) manually resulted  -     insulin lispro (HumaLOG KwikPen Insulin) 200 unit/mL (3 mL) insulin pen pen; Take as directed per insulin instructions. 5-10 units with each meal based on expected carb count.  Would stop trulicity if not able to take weekly.  Consider other GLP 1  Is on sglt2i  Adrenal nodule (CMS/HCC) declines further work up  Primary hypertension  Elevated BUN--hydrate well  Hyperlipidemia, unspecified hyperlipidemia type--crestor  Caregiver stress declined therapy at this time.  Need for hepatitis C screening test  -     Hepatitis C antibody; Future    See wrap up section for patient instructions and  additional treatment plan.

## 2023-11-06 ENCOUNTER — OFFICE VISIT (OUTPATIENT)
Dept: PRIMARY CARE | Facility: CLINIC | Age: 58
End: 2023-11-06
Payer: COMMERCIAL

## 2023-11-06 VITALS
HEART RATE: 84 BPM | RESPIRATION RATE: 18 BRPM | SYSTOLIC BLOOD PRESSURE: 104 MMHG | DIASTOLIC BLOOD PRESSURE: 56 MMHG | BODY MASS INDEX: 26.99 KG/M2 | HEIGHT: 65 IN | WEIGHT: 162 LBS

## 2023-11-06 DIAGNOSIS — E27.8 ADRENAL NODULE (MULTI): ICD-10-CM

## 2023-11-06 DIAGNOSIS — Z11.59 NEED FOR HEPATITIS C SCREENING TEST: ICD-10-CM

## 2023-11-06 DIAGNOSIS — Z63.6 CAREGIVER STRESS: ICD-10-CM

## 2023-11-06 DIAGNOSIS — I10 PRIMARY HYPERTENSION: ICD-10-CM

## 2023-11-06 DIAGNOSIS — E11.65 TYPE 2 DIABETES MELLITUS WITH HYPERGLYCEMIA, WITH LONG-TERM CURRENT USE OF INSULIN (MULTI): Primary | ICD-10-CM

## 2023-11-06 DIAGNOSIS — E78.5 HYPERLIPIDEMIA, UNSPECIFIED HYPERLIPIDEMIA TYPE: ICD-10-CM

## 2023-11-06 DIAGNOSIS — Z79.4 TYPE 2 DIABETES MELLITUS WITH HYPERGLYCEMIA, WITH LONG-TERM CURRENT USE OF INSULIN (MULTI): Primary | ICD-10-CM

## 2023-11-06 DIAGNOSIS — R79.9 ELEVATED BUN: ICD-10-CM

## 2023-11-06 LAB — POC HEMOGLOBIN A1C: 8.2 % (ref 4.2–6.5)

## 2023-11-06 PROCEDURE — 99214 OFFICE O/P EST MOD 30 MIN: CPT | Performed by: INTERNAL MEDICINE

## 2023-11-06 PROCEDURE — 3078F DIAST BP <80 MM HG: CPT | Performed by: INTERNAL MEDICINE

## 2023-11-06 PROCEDURE — 1036F TOBACCO NON-USER: CPT | Performed by: INTERNAL MEDICINE

## 2023-11-06 PROCEDURE — 3051F HG A1C>EQUAL 7.0%<8.0%: CPT | Performed by: INTERNAL MEDICINE

## 2023-11-06 PROCEDURE — 83036 HEMOGLOBIN GLYCOSYLATED A1C: CPT | Performed by: INTERNAL MEDICINE

## 2023-11-06 PROCEDURE — 4010F ACE/ARB THERAPY RXD/TAKEN: CPT | Performed by: INTERNAL MEDICINE

## 2023-11-06 PROCEDURE — 3074F SYST BP LT 130 MM HG: CPT | Performed by: INTERNAL MEDICINE

## 2023-11-06 RX ORDER — INSULIN LISPRO 200 [IU]/ML
INJECTION, SOLUTION SUBCUTANEOUS
Qty: 3 ML | Refills: 6 | Status: SHIPPED | OUTPATIENT
Start: 2023-11-06

## 2023-11-06 RX ORDER — PEN NEEDLE, DIABETIC 31 GX5/16"
NEEDLE, DISPOSABLE MISCELLANEOUS
COMMUNITY
Start: 2023-02-09

## 2023-11-06 SDOH — SOCIAL STABILITY - SOCIAL INSECURITY: DEPENDENT RELATIVE NEEDING CARE AT HOME: Z63.6

## 2023-11-06 ASSESSMENT — PAIN SCALES - GENERAL: PAINLEVEL: 0-NO PAIN

## 2023-11-06 NOTE — PATIENT INSTRUCTIONS
Change Trulicity to weekly at take at night, such as on a Friday night at bedtime.  If not tolerated then recommend stopping it.  Let me know if you stop it.    Add humalog kwik pen, 5-10 units with meals based on carb count and glucose.     Please do a urine acr  and ua and send.    We discussed calm aid as a stress reliever. One per day or one prn.  Meditation is good.    Be sure to hydrate well.    Follow up in 3-4 months.  Try checking bp after golf, after tennis or after spinning.  Plan on pap at next visit.

## 2023-11-07 NOTE — TELEPHONE ENCOUNTER
Pt states she does not need a hep c test, no exposure  Also states she does not need any of the adrenal adenoma testing other than the dex supp test which has already been done. She will think about the urinary catecholamines but if so would do the 24 hour testing..she will let me know.

## 2023-11-16 NOTE — TELEPHONE ENCOUNTER
[facility] calling asking for a copy of the last clinic notes. This form was faxed on 11/10, they're stating they have not received anything back.  Phone- [phone]  Fax- [phone]

## 2024-03-08 NOTE — PROGRESS NOTES
Subjective   Patient ID: Allyson Santiago is a 58 y.o. female who presents for Annual Exam (Pt here for annual exam. Pt declines PAP. Pt denies any new problems or concerns or providers. ).  LAST VISIT PLAN 11/6/23  Allyson was seen today for follow-up.  Diagnoses and all orders for this visit:  Type 2 diabetes mellitus with hyperglycemia, with long-term current use of insulin (CMS/HCC) (Primary)  -     POCT glycosylated hemoglobin (Hb A1C) manually resulted  -     insulin lispro (HumaLOG KwikPen Insulin) 200 unit/mL (3 mL) insulin pen pen; Take as directed per insulin instructions. 5-10 units with each meal based on expected carb count.  Would stop trulicity if not able to take weekly.  Consider other GLP 1  Is on sglt2i  Adrenal nodule (CMS/HCC) declines further work up  Primary hypertension  Elevated BUN--hydrate well  Hyperlipidemia, unspecified hyperlipidemia type--crestor  Caregiver stress declined therapy at this time.  Need for hepatitis C screening test  -     Hepatitis C antibody; Future    See wrap up section for patient instructions and  additional treatment plan.  END INFO FROM PRIOR VISIT  HPI  Here for follow-up on hypertension DM2 HLD, has a history of metformin plain intolerance, but tolerates metformin in combination with dapagliflozin in Xigduoxr at the 1000 mg dose.  It is also her annual exam per pt and we had planned to do a pap today but she declines. She is overdue for pap and is aware. Denies gyn problems.    Reviewed ros, all neg  Blood sugars: Occ lows early am or if plays golf or pickle ball or works out or in am, and would be 90 and gets symptomatic.  Declines rx for glucose tablets or glucagon.. Has never passed out    Her prevention for this is that she eats small something prior, then takes xidguo after work out with a breakfast sandwich  Does not like taste of metformin plain.  Not on trulicity, did not tolerate.  Discussed the other GLP-1 since she is reluctant to try  "them.    A1c is coming down, from 8.2 to 7.8  Diabetic retinopathy getting eye injections q6 weeks, and had laser, right eye is doing well, left one still swollen    Reviewed vaccines, needs a few, declines covid vaccines other than primary series, had covid in 2020  She has pneumovax 23 at work and may opt for that instead of prevnar 20--ok.    Cgm says A1c is 7.3    Is not taking cozaar daily  Bp fluctuates 120-150--150 usually when stressed about her mom's care  Holds cozaar twice per week at most  Needs urine albumin done  Needs bmp to monitor farxiga.  Other labs all good 6 months ago.    Reviewed cologuard 12-21 and mammo and when due.      Review of Systems  All systems reviewed and are negative unless otherwise stated in HPI or noted in writing on the written   7 page history and review of systems document reviewed by me and  scanned in with this visit.  Has lw and hcpoa  is first.  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 USE REMOTE TO SCAN AND REVIEW SUGARS    insulin lispro (HumaLOG KwikPen Insulin) 200 unit/mL (3 mL) insulin pen pen Take as directed per insulin instructions. 5-10 units with each meal based on expected carb count.    losartan (COZAAR) 25 mg, oral, Daily    OneTouch Delica Plus Lancet 33 gauge misc TAKE 1 APPLICATION AS DIRECTED 3 TIMES PER DAY FOR 90 DAYS E11.9 NON INSULIN PT TESTS 3 X DAILY    OneTouch Verio test strips strip USE THREE TIMES DAILY    rosuvastatin (CRESTOR) 10 mg, oral, Daily    Toujeo Max U-300 SoloStar 14 Units, subcutaneous, Nightly, Take as directed per insulin instructions.-SHE CUT BACK TO 14 AT NIGHT  LOW IN .  " "    Allergies   Allergen Reactions    Metformin Other     Abdominal pain    Ozempic [Semaglutide] Diarrhea and Other     Nausea, Vomiting, diarrhea    Tresiba Flextouch U-100 [Insulin Degludec] Other     Bloating, weight gain    Victoza 2-Cleve [Liraglutide] Other     nausea     Objective   Lab Results   Component Value Date    CHOL 160 09/08/2023    TRIG 107 09/08/2023    HDL 54.6 09/08/2023    ALT 14 09/08/2023    AST 15 09/08/2023     03/11/2024    K 4.3 03/11/2024     03/11/2024    CREATININE 0.79 03/11/2024    BUN 20 03/11/2024    CO2 30 03/11/2024    HGBA1C 7.8 (A) 03/11/2024     par  Physical ExamBP 120/65 (BP Location: Left arm, Patient Position: Sitting, BP Cuff Size: Adult)   Pulse 68   Resp 18   Ht 1.676 m (5' 6\")   Wt 74.4 kg (164 lb)   BMI 26.47 kg/m²       4/10/2023     4:25 PM 4/10/2023     4:30 PM 7/31/2023    10:06 AM 7/31/2023    10:09 AM 11/6/2023    11:38 AM 3/11/2024    10:44 AM 3/11/2024    12:29 PM   Vitals   Systolic 131 137 154 126 104 142 120   Diastolic 86 77 99 87 56 82 65   Heart Rate 90 76 99 94 84 84 68   Resp 18    18 18    Height (in) 1.676 m (5' 6\")  1.676 m (5' 6\")  1.651 m (5' 5\") 1.676 m (5' 6\")    Weight (lb) 165.8  163  162 164    BMI 26.76 kg/m2  26.31 kg/m2  26.96 kg/m2 26.47 kg/m2    BSA (m2) 1.87 m2  1.86 m2  1.84 m2 1.86 m2    Visit Report Report Report Report Report Report Report Report       General: Patient is known to me, looks well, is in usual state of health, with  no obvious distress.  Head: normocephalic  Eyes:  EOMI, no scleral icterus or conjunctival abnormality  Ears:Normal canals and TM's  Oropharynx: Well hydrated mucosa. no lesions, erythema. palate moves well.  Neck: No masses, no cervical (A/P/ or Lateral) adenopathy. Thyroid not enlarged and no nodules. Carotid pulses normal and no bruits.  Chest: Normal AP diameter. No supraclavicular adenopathy.  Lungs: Clear to auscultation: no rales , wheezes, rhonchi, rubs, examined bilaterally, " ant/post /lateral. RR normal.  Heart: RRR. No MGCR S1 S2 normal.  Abd: BS normal, no bruits. No hepatosplenomegaly. No abdominal mass or tenderness. No distension.  Extremities: No upper extremity edema. No lower leg edema.No ischemia.   Joints: no synovitis seen. No deformities.  Pulses: normal posterior tibialis pulses, normal dorsalis pedis pulses. normal radial pulses. Normal femoral pulses without bruits.  Neuro: CN 2-12 intact . Alert, oriented, appropriate.  No focal weakness observed. No tremors. Ambulation is normal .  Psych: Mood and affect normal. No cognitive deficits noted during this exam. Alert, oriented, appropriate.  Skin: No rash, petechiae or excessive bruising.  Lymph: no SC axillary or inguinal adenopathy  Declines pelvic and be  Diabetic foot exam:  Skin normal no lesions.  DP and PT pulses within normal limits.  Callus: none  Deformities: none.  Monofilament testing: Normal.         Allyson was seen today for annual exam.  Diagnoses and all orders for this visit:  Encounter for annual physical exam (Primary)  Type 2 diabetes mellitus with hyperglycemia, with long-term current use of insulin (CMS/[facility])  -     POCT glycosylated hemoglobin (Hb A1C) manually resulted  -     POCT Albumin Random Urine manually resulted  -     POCT UA Automated manually resulted  -     Basic metabolic panel; Future  -     Discontinue: pneumoc 20-pilar conj-dip cr,PF, (Prevnar) 0.5 mL vaccine; Inject 0.5 mL into the muscle 1 time for 1 dose.  -     pneumoc 20-pilar conj-dip cr,PF, (Prevnar) 0.5 mL vaccine; Inject 0.5 mL into the muscle 1 time for 1 dose.  Elevated BUN  -     Basic metabolic panel; Future  Screen for colon cancer  -     Cologuard® colon cancer screening; Future  -     Cologuard® colon cancer screening  Need for Tdap vaccination  -     diphth,pertus,acell,,tetanus (BoostRIX) 2.5-8-5 Lf-mcg-Lf/0.5mL injection; Inject 0.5 mL into the muscle 1 time for 1 dose. Please administer one tdap vaccine.  Visit for  screening mammogram  -     BI mammo bilateral screening tomosynthesis; Future  Hypertension, unspecified type  Other hyperlipidemia  Hyperlipidemia associated with type 2 diabetes mellitus (CMS/HCC)  Agatston coronary artery calcium score greater than 400  Adrenal nodule (CMS/HCC)  Pap smear of cervix declined  Diabetic retinopathy associated with type 2 diabetes mellitus, macular edema presence unspecified, unspecified laterality, unspecified retinopathy severity (CMS/HCC)  Immunization counseling    Need for vaccination against streptococcus pneumoniae-pt will do at her office or pharmacy or contact us.  She has completed the part of work up for adrenal nodule that she felt was important to do and declines the rest.  Dm2 is improving a little, discussed cgm, other meds she is on max sglt2i and max tlerated metformin at 1000 mg daily  Her insulin requirement is decreasing some. She is keeping up with exercise and does her best at diet.  Offered endocrinology referral -declined.  Will ck urine today for glucose and ck for protein and urine acr.  High risk cac score-fortunately asymptomatic.: ldl was 84. Ideal is less than 70.  Continue statin. She is on only 10mg crestor-consider increase to 20mg if not at goal when next ldl is due.   She saw cardiology, they did not put her on asa.  Prefers cologuard to colonoscopy, no fam hx of colon ca-due dec 2024.  Encouraged to obtain screening pap.  See wrap up section for patient instructions and  additional treatment plan.

## 2024-03-11 ENCOUNTER — OFFICE VISIT (OUTPATIENT)
Dept: PRIMARY CARE | Facility: CLINIC | Age: 59
End: 2024-03-11
Payer: COMMERCIAL

## 2024-03-11 ENCOUNTER — LAB (OUTPATIENT)
Dept: LAB | Facility: LAB | Age: 59
End: 2024-03-11
Payer: COMMERCIAL

## 2024-03-11 VITALS
RESPIRATION RATE: 18 BRPM | WEIGHT: 164 LBS | BODY MASS INDEX: 26.36 KG/M2 | HEART RATE: 68 BPM | SYSTOLIC BLOOD PRESSURE: 120 MMHG | DIASTOLIC BLOOD PRESSURE: 65 MMHG | HEIGHT: 66 IN

## 2024-03-11 DIAGNOSIS — Z23 NEED FOR TDAP VACCINATION: ICD-10-CM

## 2024-03-11 DIAGNOSIS — E27.8 ADRENAL NODULE (MULTI): ICD-10-CM

## 2024-03-11 DIAGNOSIS — E11.319 DIABETIC RETINOPATHY ASSOCIATED WITH TYPE 2 DIABETES MELLITUS, MACULAR EDEMA PRESENCE UNSPECIFIED, UNSPECIFIED LATERALITY, UNSPECIFIED RETINOPATHY SEVERITY (MULTI): ICD-10-CM

## 2024-03-11 DIAGNOSIS — R79.9 ELEVATED BUN: ICD-10-CM

## 2024-03-11 DIAGNOSIS — E78.5 HYPERLIPIDEMIA ASSOCIATED WITH TYPE 2 DIABETES MELLITUS (MULTI): ICD-10-CM

## 2024-03-11 DIAGNOSIS — Z71.85 IMMUNIZATION COUNSELING: ICD-10-CM

## 2024-03-11 DIAGNOSIS — E11.69 HYPERLIPIDEMIA ASSOCIATED WITH TYPE 2 DIABETES MELLITUS (MULTI): ICD-10-CM

## 2024-03-11 DIAGNOSIS — Z12.11 SCREEN FOR COLON CANCER: ICD-10-CM

## 2024-03-11 DIAGNOSIS — Z53.20 PAP SMEAR OF CERVIX DECLINED: ICD-10-CM

## 2024-03-11 DIAGNOSIS — Z79.4 TYPE 2 DIABETES MELLITUS WITH HYPERGLYCEMIA, WITH LONG-TERM CURRENT USE OF INSULIN (MULTI): ICD-10-CM

## 2024-03-11 DIAGNOSIS — Z00.00 ENCOUNTER FOR ANNUAL PHYSICAL EXAM: Primary | ICD-10-CM

## 2024-03-11 DIAGNOSIS — Z12.31 VISIT FOR SCREENING MAMMOGRAM: ICD-10-CM

## 2024-03-11 DIAGNOSIS — E11.65 TYPE 2 DIABETES MELLITUS WITH HYPERGLYCEMIA, WITH LONG-TERM CURRENT USE OF INSULIN (MULTI): ICD-10-CM

## 2024-03-11 DIAGNOSIS — R93.1 AGATSTON CORONARY ARTERY CALCIUM SCORE GREATER THAN 400: ICD-10-CM

## 2024-03-11 DIAGNOSIS — I10 HYPERTENSION, UNSPECIFIED TYPE: ICD-10-CM

## 2024-03-11 DIAGNOSIS — E78.49 OTHER HYPERLIPIDEMIA: ICD-10-CM

## 2024-03-11 PROBLEM — M79.2 RADICULAR PAIN IN LEFT ARM: Status: RESOLVED | Noted: 2024-03-11 | Resolved: 2024-03-11

## 2024-03-11 PROBLEM — R14.0 ABDOMINAL BLOATING: Status: RESOLVED | Noted: 2024-03-11 | Resolved: 2024-03-11

## 2024-03-11 PROBLEM — I25.84 CALCIFICATION OF CORONARY ARTERY: Status: ACTIVE | Noted: 2024-03-11

## 2024-03-11 PROBLEM — R06.02 SHORTNESS OF BREATH ON EXERTION: Status: RESOLVED | Noted: 2024-03-11 | Resolved: 2024-03-11

## 2024-03-11 PROBLEM — I25.10 CALCIFICATION OF CORONARY ARTERY: Status: ACTIVE | Noted: 2024-03-11

## 2024-03-11 LAB
POC ALBUMIN /CREATININE RATIO MANUALLY ENTERED: <30 UG/MG CREAT
POC APPEARANCE, URINE: CLEAR
POC BILIRUBIN, URINE: NEGATIVE
POC BLOOD, URINE: NEGATIVE
POC COLOR, URINE: ABNORMAL
POC GLUCOSE, URINE: ABNORMAL MG/DL
POC HEMOGLOBIN A1C: 7.8 % (ref 4.2–6.5)
POC KETONES, URINE: NEGATIVE MG/DL
POC LEUKOCYTES, URINE: NEGATIVE
POC NITRITE,URINE: NEGATIVE
POC PH, URINE: 5.5 PH
POC PROTEIN, URINE: NEGATIVE MG/DL
POC SPECIFIC GRAVITY, URINE: 1.02
POC URINE ALBUMIN: 30 MG/L
POC URINE CREATININE: 100 MG/DL
POC UROBILINOGEN, URINE: 0.2 EU/DL

## 2024-03-11 PROCEDURE — 36415 COLL VENOUS BLD VENIPUNCTURE: CPT

## 2024-03-11 PROCEDURE — 1036F TOBACCO NON-USER: CPT | Performed by: INTERNAL MEDICINE

## 2024-03-11 PROCEDURE — 99396 PREV VISIT EST AGE 40-64: CPT | Performed by: INTERNAL MEDICINE

## 2024-03-11 PROCEDURE — 83036 HEMOGLOBIN GLYCOSYLATED A1C: CPT | Performed by: INTERNAL MEDICINE

## 2024-03-11 PROCEDURE — 81003 URINALYSIS AUTO W/O SCOPE: CPT | Performed by: INTERNAL MEDICINE

## 2024-03-11 PROCEDURE — 4010F ACE/ARB THERAPY RXD/TAKEN: CPT | Performed by: INTERNAL MEDICINE

## 2024-03-11 PROCEDURE — 80048 BASIC METABOLIC PNL TOTAL CA: CPT

## 2024-03-11 PROCEDURE — 3074F SYST BP LT 130 MM HG: CPT | Performed by: INTERNAL MEDICINE

## 2024-03-11 PROCEDURE — 82044 UR ALBUMIN SEMIQUANTITATIVE: CPT | Performed by: INTERNAL MEDICINE

## 2024-03-11 PROCEDURE — 3078F DIAST BP <80 MM HG: CPT | Performed by: INTERNAL MEDICINE

## 2024-03-11 RX ORDER — DAPAGLIFLOZIN AND METFORMIN HYDROCHLORIDE 10; 1000 MG/1; MG/1
1 TABLET, FILM COATED, EXTENDED RELEASE ORAL
COMMUNITY

## 2024-03-11 SDOH — HEALTH STABILITY: PHYSICAL HEALTH: ON AVERAGE, HOW MANY DAYS PER WEEK DO YOU ENGAGE IN MODERATE TO STRENUOUS EXERCISE (LIKE A BRISK WALK)?: 4 DAYS

## 2024-03-11 SDOH — ECONOMIC STABILITY: INCOME INSECURITY: IN THE LAST 12 MONTHS, WAS THERE A TIME WHEN YOU WERE NOT ABLE TO PAY THE MORTGAGE OR RENT ON TIME?: NO

## 2024-03-11 SDOH — ECONOMIC STABILITY: HOUSING INSECURITY: IN THE LAST 12 MONTHS, HOW MANY PLACES HAVE YOU LIVED?: 1

## 2024-03-11 SDOH — HEALTH STABILITY: PHYSICAL HEALTH: ON AVERAGE, HOW MANY MINUTES DO YOU ENGAGE IN EXERCISE AT THIS LEVEL?: 90 MIN

## 2024-03-11 ASSESSMENT — PATIENT HEALTH QUESTIONNAIRE - PHQ9
2. FEELING DOWN, DEPRESSED OR HOPELESS: NOT AT ALL
1. LITTLE INTEREST OR PLEASURE IN DOING THINGS: NOT AT ALL
1. LITTLE INTEREST OR PLEASURE IN DOING THINGS: NOT AT ALL
SUM OF ALL RESPONSES TO PHQ9 QUESTIONS 1 & 2: 0
2. FEELING DOWN, DEPRESSED OR HOPELESS: NOT AT ALL
SUM OF ALL RESPONSES TO PHQ9 QUESTIONS 1 AND 2: 0

## 2024-03-11 ASSESSMENT — LIFESTYLE VARIABLES
AUDIT-C TOTAL SCORE: 2
SKIP TO QUESTIONS 9-10: 1
HOW OFTEN DO YOU HAVE A DRINK CONTAINING ALCOHOL: 2-4 TIMES A MONTH
HOW MANY STANDARD DRINKS CONTAINING ALCOHOL DO YOU HAVE ON A TYPICAL DAY: 1 OR 2
HOW OFTEN DO YOU HAVE SIX OR MORE DRINKS ON ONE OCCASION: NEVER

## 2024-03-11 ASSESSMENT — ANXIETY QUESTIONNAIRES
4. TROUBLE RELAXING: NOT AT ALL
1. FEELING NERVOUS, ANXIOUS, OR ON EDGE: NOT AT ALL
5. BEING SO RESTLESS THAT IT IS HARD TO SIT STILL: NOT AT ALL
6. BECOMING EASILY ANNOYED OR IRRITABLE: NOT AT ALL
7. FEELING AFRAID AS IF SOMETHING AWFUL MIGHT HAPPEN: NOT AT ALL
GAD7 TOTAL SCORE: 0
3. WORRYING TOO MUCH ABOUT DIFFERENT THINGS: NOT AT ALL
2. NOT BEING ABLE TO STOP OR CONTROL WORRYING: NOT AT ALL

## 2024-03-11 ASSESSMENT — SOCIAL DETERMINANTS OF HEALTH (SDOH)
WITHIN THE LAST YEAR, HAVE TO BEEN RAPED OR FORCED TO HAVE ANY KIND OF SEXUAL ACTIVITY BY YOUR PARTNER OR EX-PARTNER?: NO
IN A TYPICAL WEEK, HOW MANY TIMES DO YOU TALK ON THE PHONE WITH FAMILY, FRIENDS, OR NEIGHBORS?: MORE THAN THREE TIMES A WEEK
WITHIN THE LAST YEAR, HAVE YOU BEEN AFRAID OF YOUR PARTNER OR EX-PARTNER?: NO
HOW HARD IS IT FOR YOU TO PAY FOR THE VERY BASICS LIKE FOOD, HOUSING, MEDICAL CARE, AND HEATING?: NOT HARD AT ALL
HOW OFTEN DO YOU GET TOGETHER WITH FRIENDS OR RELATIVES?: MORE THAN THREE TIMES A WEEK
WITHIN THE LAST YEAR, HAVE YOU BEEN KICKED, HIT, SLAPPED, OR OTHERWISE PHYSICALLY HURT BY YOUR PARTNER OR EX-PARTNER?: NO
HOW OFTEN DO YOU ATTENT MEETINGS OF THE CLUB OR ORGANIZATION YOU BELONG TO?: MORE THAN 4 TIMES PER YEAR
WITHIN THE LAST YEAR, HAVE YOU BEEN HUMILIATED OR EMOTIONALLY ABUSED IN OTHER WAYS BY YOUR PARTNER OR EX-PARTNER?: NO
DO YOU BELONG TO ANY CLUBS OR ORGANIZATIONS SUCH AS CHURCH GROUPS UNIONS, FRATERNAL OR ATHLETIC GROUPS, OR SCHOOL GROUPS?: YES
HOW OFTEN DO YOU ATTEND CHURCH OR RELIGIOUS SERVICES?: MORE THAN 4 TIMES PER YEAR

## 2024-03-11 ASSESSMENT — COLUMBIA-SUICIDE SEVERITY RATING SCALE - C-SSRS
2. HAVE YOU ACTUALLY HAD ANY THOUGHTS OF KILLING YOURSELF?: NO
1. IN THE PAST MONTH, HAVE YOU WISHED YOU WERE DEAD OR WISHED YOU COULD GO TO SLEEP AND NOT WAKE UP?: NO
6. HAVE YOU EVER DONE ANYTHING, STARTED TO DO ANYTHING, OR PREPARED TO DO ANYTHING TO END YOUR LIFE?: NO

## 2024-03-11 ASSESSMENT — PAIN SCALES - GENERAL: PAINLEVEL: 0-NO PAIN

## 2024-03-11 NOTE — PATIENT INSTRUCTIONS
Urine albumin test today.  Blood test non fasting today /soon to monitor farxiga.      Mammogram due November 2024.  Cologuard is due December 2024 (3 years).  Prevnar 20 vaccine recommended.    TDAP vaccine recommended.  You mentioned you preferred not getting a hep B booster but if you change your mind they would give it to you at the dialysis center.     Follow up as scheduled.  Sooner if needed.

## 2024-03-12 LAB
ANION GAP SERPL CALC-SCNC: 12 MMOL/L (ref 10–20)
BUN SERPL-MCNC: 20 MG/DL (ref 6–23)
CALCIUM SERPL-MCNC: 9.9 MG/DL (ref 8.6–10.6)
CHLORIDE SERPL-SCNC: 104 MMOL/L (ref 98–107)
CO2 SERPL-SCNC: 30 MMOL/L (ref 21–32)
CREAT SERPL-MCNC: 0.79 MG/DL (ref 0.5–1.05)
EGFRCR SERPLBLD CKD-EPI 2021: 87 ML/MIN/1.73M*2
GLUCOSE SERPL-MCNC: 145 MG/DL (ref 74–99)
POTASSIUM SERPL-SCNC: 4.3 MMOL/L (ref 3.5–5.3)
SODIUM SERPL-SCNC: 142 MMOL/L (ref 136–145)

## 2024-03-15 PROBLEM — E11.69 HYPERLIPIDEMIA ASSOCIATED WITH TYPE 2 DIABETES MELLITUS (MULTI): Status: ACTIVE | Noted: 2023-06-02

## 2024-07-12 NOTE — PROGRESS NOTES
Patient ID: Allyson Santiago is a 59 y.o. female who presents for Follow-up (Pt here for follow up and review. Pt denies any new problems or concerns at this time. Pt would like refill of Toujeo to [facility] pharmacy)    LAST VISIT PLAN FROM: 03/11/2024  Diagnoses and all orders for this visit:  Encounter for annual physical exam (Primary)  Type 2 diabetes mellitus with hyperglycemia, with long-term current use of insulin (CMS/HCC)  -     POCT glycosylated hemoglobin (Hb A1C) manually resulted  -     POCT Albumin Random Urine manually resulted  -     POCT UA Automated manually resulted  -     Basic metabolic panel; Future  -     Discontinue: pneumoc 20-pilar conj-dip cr,PF, (Prevnar) 0.5 mL vaccine; Inject 0.5 mL into the muscle 1 time for 1 dose.  -     pneumoc 20-pilar conj-dip cr,PF, (Prevnar) 0.5 mL vaccine; Inject 0.5 mL into the muscle 1 time for 1 dose.  Elevated BUN  -     Basic metabolic panel; Future  Screen for colon cancer  -     Cologuard® colon cancer screening; Future  -     Cologuard® colon cancer screening  Need for Tdap vaccination  -     diphth,pertus,acell,,tetanus (BoostRIX) 2.5-8-5 Lf-mcg-Lf/0.5mL injection; Inject 0.5 mL into the muscle 1 time for 1 dose. Please administer one tdap vaccine.  Visit for screening mammogram  -     BI mammo bilateral screening tomosynthesis; Future  Hypertension, unspecified type  Other hyperlipidemia  Hyperlipidemia associated with type 2 diabetes mellitus (CMS/HCC)  Agatston coronary artery calcium score greater than 400  Adrenal nodule (CMS/HCC)  Pap smear of cervix declined  Diabetic retinopathy associated with type 2 diabetes mellitus, macular edema presence unspecified, unspecified laterality, unspecified retinopathy severity (CMS/HCC)  Immunization counseling    Need for vaccination against streptococcus pneumoniae-pt will do at her office or pharmacy or contact us.  She has completed the part of work up for adrenal nodule that she felt was important  to do and declines the rest.  Dm2 is improving a little, discussed cgm, other meds she is on max sglt2i and max tlerated metformin at 1000 mg daily  Her insulin requirement is decreasing some. She is keeping up with exercise and does her best at diet.  Offered endocrinology referral -declined.  Will ck urine today for glucose and ck for protein and urine acr.  High risk cac score-fortunately asymptomatic.: ldl was 84. Ideal is less than 70.  Continue statin. She is on only 10mg crestor-consider increase to 20mg if not at goal when next ldl is due.   She saw cardiology, they did not put her on asa.  Prefers cologuard to colonoscopy, no fam hx of colon ca-due dec 2024.  Encouraged to obtain screening pap.  See wrap up section for patient instructions and  additional treatment plan.  END LAST VISIT PLAN  HPI  Patient is 59-year-old female who presents today for 4-month follow up of diabetes, HTN and hyperlipidemia.    Diabetes:  A1c today is 8.6%. she notes she feels her cgm is running lower numbers than it should as her fingerstick glucose humbers are significantly higher. Her cgm estimated her A1c in the 7's but it is higher than that today.  Last glucose check on 03/11/2024 was 145 mg/dL.  Last A1c on 3/11/24 was 7.8%  Type II:  Is taking medications regularly, denies side effects.  Denies hypoglycemia, polyuria, polydipsia.  No new numbness or paresthesias of extremities. No syncope or dizziness. No blurred vision.  She notes she has retinopathy but did not have to get an injection in her eyes at her last visit -she is pleased about this.    Lab Results   Component Value Date    HGBA1C 8.6 (A) 07/15/2024    HGBA1C 7.8 (A) 03/11/2024    HGBA1C 8.2 (A) 11/06/2023     Lab Results   Component Value Date    CREATININE 0.79 03/11/2024         Lab Results   Component Value Date    GLUCOSE 145 (H) 03/11/2024    CALCIUM 9.9 03/11/2024     03/11/2024    K 4.3 03/11/2024    CO2 30 03/11/2024     03/11/2024    BUN  20 03/11/2024    CREATININE 0.79 03/11/2024      Continuous glucose monitor data    Duration (days) %Above Target % In Range %Low     Average glucose Usage rate/scans per day    Set Target Range (low-high)    7 days            14 days  48%   52%  0%  185 mg/dL with estimated A1c of 5.7%.    30 days     90 days  51%   49% Out of these ranges, 10% are very high.    Any lows symptomatic? No.    She states, she seems to run high after meals.  Patient states that her finger sticks are usually higher when compared to the CGM. She reports taking Humalog 6-10 units if she goes out to eat.  She is taking Toujeo 24-26 units at bedtime.  She will exercise in the afternoon and states that her blood sugar is high after she eats lasting about 2 hours.  She has tried increasing her Humalog.  We discussed options of additional medication to control her blood sugar.  She was intolerant of GLP-1 medication.  We discussed trying Xigduo XR 5-1000 mg 2 tablets once daily with breakfast.  We also discussed that we could increase her Humalog.  She states, she is very sensitive to low blood sugar, reporting that she will feel hungry and jittery even when in normal blood sugar range.  We also discussed dietary modifications of sugarless peanut butter (all natural), apple instead of banana, and sugarless bread.  She reports not liking to eat much for breakfast. We discussed the importance of eating when taking the Xigduo medication.  We also discussed a referral to Endocrinology or dietician.  She reports that she will be going with her mother to see a dietician.  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     Hyperlipidemia:  No myalgias, nausea, abdominal pain.  Meds: Taking regularly, no adverse effect.  Continues on rosuvastatin 10 mg daily.  Labs: Last LDL on 09/08/2023 was 84 mg/dL.  LDL  "goal: <70 mg/dL.  She reports, she is consistent with her statin medication.    Hypertension:  Patient reports cutting her Cozaar 50 mg in half (25 mg) reporting that she takes the medication 3 times per week and is not consistent.  She states that she is not sure that she needs the medication.  She states that she will feel lightheaded at times while exercising.  She has a wrist cuff at home.  We discussed obtaining an Omron BP cuff as wrist cuffs are not as accurate, especially in women.  Recommended checking BP at least 3 times per week. And finding the dose that she can take regularly.I offered to prescribe 25 mg so she does not have to cut them in half or possible taking 12.5 mg daily, but she states that she would like to keep getting the 50 mg tablets as it is not as many trips to the pharmacy.    Follow up on cardiovascular conditions:  Cardiac:    Chest pain?No   Palpitations? No   Increased lebron?  No   Other:  Resp:    Shortness of breath?No   Wheezing No   Cough No   Other:  Extremities:    Leg or ankle swelling?No    Claudication?No   Other:  Neuro:   DizzinessNo unless bp is low   SyncopeNo    Blurred visionNo   New headaches No  Other:    Medications:   Current Outpatient Medications   Medication Instructions    BD Ultra-Fine Short Pen Needle 31 gauge x 5/16\" needle     dapaglifloz propaned-metformin (Xigduo XR) 5-1,000 mg 2 tablets, oral, Daily with breakfast    Dexcom G7 Sensor device APPLY DEVICE TO POSTERIOR ARM AND LEAVE FOR 10 DAYS. USE REMOTE TO SCAN AND REVIEW SUGARS    diphth,pertus,acell,,tetanus (BoostRIX) 2.5-8-5 Lf-mcg-Lf/0.5mL injection 0.5 mL, intramuscular, Once, Needs tdap injection    insulin glargine (TOUJEO MAX U-300 SOLOSTAR) 26 Units, subcutaneous, Nightly, Or Take as directed per insulin instructions    insulin lispro (HumaLOG KwikPen Insulin) 200 unit/mL (3 mL) insulin pen pen Take as directed per insulin instructions. 5-10 units with each meal based on expected carb count.    " losartan (COZAAR) 25 mg, oral, Daily, 3 times per week    OneTouch Delica Plus Lancet 33 gauge misc TAKE 1 APPLICATION AS DIRECTED 3 TIMES PER DAY FOR 90 DAYS E11.9 NON INSULIN PT TESTS 3 X DAILY    OneTouch Verio test strips strip USE THREE TIMES DAILY    rosuvastatin (CRESTOR) 10 mg, oral, Daily       Taking them regularly.Yes   Side effects.No    Mood:  Stable.  She reports golfing and playing tennis more than usual which improves her mood.  She recently had a golfing vacation in [place].     Tendonitis of Right Shoulder:  She can move it, but it is difficult, onset beginning of summer.  She is unable to sleep on her right shoulder.  She reports playing more golf and tennis recently.  She denies numbness or tingling but reports dropping things, intermittently.  She reports that it does not affect her games but will bother her after playing.  Recommended icing her shoulder after playing tennis or golf for 20 minutes along with Voltaren gel 2-3 times per day.  Referral placed for PT.  We discussed that PT can recommend at-home exercises as well.  Order for XR placed if symptoms persist.    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    She has 1 child who has graduated from college (computer science) and 2 other children currently attending college.    We reviewed and updated patient's medication list.  Refills prescribed as required.    We discussed vaccines.  Recommended Pneumovax and  T-dap vaccine.    Repeat blood work ordered to be completed prior to next follow up.    Cologuard or colonoscopy due 11/29/2024.    Follow up in 11/11/2024, scheduled.    We discussed the adrenal nodule which is not large enough to have concern about malignancy. We have done the overnight dex supp test and it was normal. Her post decadron cortisol was very low.  It met criteria for a normal test not cushings.  I still recommend aldosterone level even though her potassium is normal. Showed  "her the [employer] flow chart recommendation.  Review of Systems  See ROS in HPI    Current Outpatient Medications   Medication Instructions    BD Ultra-Fine Short Pen Needle 31 gauge x 5/16\" needle     dapaglifloz propaned-metformin (Xigduo XR) 5-1,000 mg 2 tablets, oral, Daily with breakfast    Dexcom G7 Sensor device APPLY DEVICE TO POSTERIOR ARM AND LEAVE FOR 10 DAYS. USE REMOTE TO SCAN AND REVIEW SUGARS    diphth,pertus,acell,,tetanus (BoostRIX) 2.5-8-5 Lf-mcg-Lf/0.5mL injection 0.5 mL, intramuscular, Once, Needs tdap injection    insulin glargine (TOUJEO MAX U-300 SOLOSTAR) 26 Units, subcutaneous, Nightly, Or Take as directed per insulin instructions    insulin lispro (HumaLOG KwikPen Insulin) 200 unit/mL (3 mL) insulin pen pen Take as directed per insulin instructions. 5-10 units with each meal based on expected carb count.    losartan (COZAAR) 25 mg, oral, Daily, 3 times per week    OneTouch Delica Plus Lancet 33 gauge misc TAKE 1 APPLICATION AS DIRECTED 3 TIMES PER DAY FOR 90 DAYS E11.9 NON INSULIN PT TESTS 3 X DAILY    OneTouch Verio test strips strip USE THREE TIMES DAILY    rosuvastatin (CRESTOR) 10 mg, oral, Daily     Allergies   Allergen Reactions    Metformin Other     Abdominal pain    Ozempic [Semaglutide] Diarrhea and Other     Nausea, Vomiting, diarrhea  Rybelsus caused diarrhea, lost 10 pds    Tresiba Flextouch U-100 [Insulin Degludec] Other     Bloating, weight gain    Victoza 2-Cleve [Liraglutide] Other     nausea     Objective  Results  reviewed:  Lab Results   Component Value Date    CHOL 160 09/08/2023    TRIG 107 09/08/2023    HDL 54.6 09/08/2023    ALT 14 09/08/2023    AST 15 09/08/2023     03/11/2024    K 4.3 03/11/2024     03/11/2024    CREATININE 0.79 03/11/2024    BUN 20 03/11/2024    CO2 30 03/11/2024    HGBA1C 8.6 (A) 07/15/2024     Physical ExamBP 126/64 (BP Location: Right arm)   Pulse 98   Ht 1.651 m (5' 5\")   Wt 73.5 kg (162 lb)   BMI 26.96 kg/m²       7/31/2023    " "10:09 AM 11/6/2023    11:38 AM 3/11/2024    10:44 AM 3/11/2024    12:29 PM 7/15/2024     8:07 AM 7/15/2024     8:49 AM 7/15/2024     8:50 AM   Vitals   Systolic 126 104 142 120 148 121 126   Diastolic 87 56 82 65 101 68 64   Heart Rate 94 84 84 68 98     Resp  18 18       Height (in)  1.651 m (5' 5\") 1.676 m (5' 6\")  1.651 m (5' 5\")     Weight (lb)  162 164  162     BMI  26.96 kg/m2 26.47 kg/m2  26.96 kg/m2     BSA (m2)  1.84 m2 1.86 m2  1.84 m2        Patient looks well and is in no obvious distress.  HEENT:   Normocephalic, no facial asymmetry  Eyes: Sclera and conjunctiva are clear.  Neck: No adenopathy cervical (Ant/post/lat), no Supraclavicular nodes.   Thyroid normal.  Carotid pulses normal, no carotid bruits.  Lungs : RR normal. Clear to auscultation anterior, posterior and lateral. No rales, wheezes rhonchi or rubs. Good air exchange.  Heart: RRR. No Murmur, gallop, click or rub.  Abdomen: Bowel sounds normal, No bruits. No pulsatile mass. No hepatosplenomegaly, masses or tenderness. Soft, no guarding.  Vascular:  Posterior tibialis pulses at 1+, bilaterally.   Extremities: no upper extremity edema. No lower extremity edema.   Musculoskeletal: No synovitis of joints seen. No new deformity. She has pain at 70 degree abduction on active and passive ROM.  She has anterior pain in the bicipital grove and medially.  No axillary or supraclavicular adenopathy.  No evidence of epicondylitis.  Good strength.  Normal radial pulses.  No evidence of ischemia.  Neuro: CN 2-12 intact. Alert, appropriate.  Ambulates independently.  No gross motor deficit.   No tremors.  Psych: normal mood and affect.  Skin: No rash, bruising petechiae or jaundice.    Allyson was seen today for follow-up.  Diagnoses and all orders for this visit:  Type 2 diabetes mellitus with hyperglycemia, with long-term current use of insulin (Multi) (Primary)  -     Cancel: Hemoglobin A1c; Future  -     Cancel: TSH with reflex to Free T4 if abnormal; " Future  -     Cancel: Vitamin B12; Future  -     dapaglifloz propaned-metformin (Xigduo XR) 5-1,000 mg; Take 2 tablets by mouth once daily with breakfast.  -     Cancel: Hemoglobin A1c; Future  -     Hemoglobin A1c; Future  -     TSH with reflex to Free T4 if abnormal; Future  -     Vitamin B12; Future  -     POCT glycosylated hemoglobin (Hb A1C) manually resulted  Agatston coronary artery calcium score greater than 400  Primary hypertension  -     Cancel: Albumin-Creatinine Ratio, Urine Random; Future  -     Cancel: Aldosterone; Future  -     Albumin-Creatinine Ratio, Urine Random; Future  -     Aldosterone; Future  Adrenal nodule (Multi)  Comments:  negative overnight dex supp test. remainder of metabolic work up declined  no hu given but denser than fluid, stable at 2 years. 2.5cm  Orders:  -     Cancel: Aldosterone; Future  -     Aldosterone; Future  Blood tests for routine general physical examination  -     Cancel: CBC and Auto Differential; Future  -     Cancel: Lipid Panel; Future  -     Cancel: Comprehensive Metabolic Panel; Future  -     Cancel: TSH with reflex to Free T4 if abnormal; Future  -     CBC and Auto Differential; Future  -     Lipid Panel; Future  -     Comprehensive Metabolic Panel; Future  -     TSH with reflex to Free T4 if abnormal; Future  Hyperlipidemia associated with type 2 diabetes mellitus (Multi)  -     Cancel: Cholesterol, LDL Direct; Future  -     Cholesterol, LDL Direct; Future  Vitamin D insufficiency  -     Cancel: Vitamin D 25-Hydroxy,Total (for eval of Vitamin D levels); Future  -     Vitamin D 25-Hydroxy,Total (for eval of Vitamin D levels); Future  Bicipital tendonitis of right shoulder  -     Referral to Physical Therapy; Future  -     XR shoulder right 2+ views; Future  Decreased range of motion of right shoulder  -     Referral to Physical Therapy; Future  -     XR shoulder right 2+ views; Future  Encounter for monitoring statin therapy  -     CK; Future  Need for Tdap  vaccination  -     diphth,pertus,acell,,tetanus (BoostRIX) 2.5-8-5 Lf-mcg-Lf/0.5mL injection; Inject 0.5 mL into the muscle 1 time for 1 dose. Needs tdap injection  Other orders  -     insulin glargine (Toujeo Max U-300 SoloStar) 300 unit/mL (3 mL) injection; Inject 26 Units under the skin once daily at bedtime. Or Take as directed per insulin instructions  -     losartan (Cozaar) 50 mg tablet; Take 0.5 tablets (25 mg) by mouth once daily. 3 times per week         See patient instructions in wrap up plan, orders and comments for treatment plan.  Patient Instructions:    Fasting blood test October 15  Instructions for obtaining lab tests:   You do not need a paper requisition when obtaining tests at a  facility. No appointment is needed for lab tests.  For fasting blood tests:  Please do not consume calories for 10-12 hours prior to this blood test. It is ok to drink water, you should be hydrated.  Please do not take vitamins, supplements or thyroid medication before your blood is drawn this day.   Most lab results should be available for your review on the  My Chart portal within 48 hours. Please contact the office if you have not received results within 2 weeks of obtaining the test.    Cologuard or colonoscopy recommended November 29 2024 or soon after-order is in for cologuard.    PT for right shoulder.  Ice for 20 minutes after playing golf or tennis  Voltaren gel 2 grams at least twice per day, can do 4 times per day.  Xray  if not improving    Follow up November as planned.  A1c is 8.6    Change xidguo to 5mg/1000 mg Er 2 tabs once per day in the am with breakfast.    You need a pneumovax  You need a tdap.      Scribe Attestation  By signing my name below, IValorie, Scrsimone   attest that this documentation has been prepared under the direction and in the presence of Parris Knowles MD.

## 2024-07-14 NOTE — PATIENT INSTRUCTIONS
Fasting blood test October 15  Instructions for obtaining lab tests:   You do not need a paper requisition when obtaining tests at a  facility. No appointment is needed for lab tests.  For fasting blood tests:  Please do not consume calories for 10-12 hours prior to this blood test. It is ok to drink water, you should be hydrated.  Please do not take vitamins, supplements or thyroid medication before your blood is drawn this day.   Most lab results should be available for your review on the  My Chart portal within 48 hours. Please contact the office if you have not received results within 2 weeks of obtaining the test.    Cologuard or colonoscopy recommended November 29 2024 or soon after-order is in for cologuard.    PT for right shoulder.  Ice for 20 minutes after playing golf or tennis  Voltaren gel 2 grams at least twice per day, can do 4 times per day.  Xray  if not improving    Follow up November as planned.  A1c is 8.6    Change xidguo to 5mg/1000 mg Er 2 tabs once per day in the am with breakfast.    You need a pneumovax  You need a tdap.

## 2024-07-15 ENCOUNTER — APPOINTMENT (OUTPATIENT)
Dept: PRIMARY CARE | Facility: CLINIC | Age: 59
End: 2024-07-15
Payer: COMMERCIAL

## 2024-07-15 VITALS
HEIGHT: 65 IN | WEIGHT: 162 LBS | DIASTOLIC BLOOD PRESSURE: 64 MMHG | HEART RATE: 98 BPM | BODY MASS INDEX: 26.99 KG/M2 | SYSTOLIC BLOOD PRESSURE: 126 MMHG

## 2024-07-15 DIAGNOSIS — Z79.899 ENCOUNTER FOR MONITORING STATIN THERAPY: ICD-10-CM

## 2024-07-15 DIAGNOSIS — M75.21 BICIPITAL TENDONITIS OF RIGHT SHOULDER: ICD-10-CM

## 2024-07-15 DIAGNOSIS — I10 PRIMARY HYPERTENSION: Primary | ICD-10-CM

## 2024-07-15 DIAGNOSIS — Z51.81 ENCOUNTER FOR MONITORING STATIN THERAPY: ICD-10-CM

## 2024-07-15 DIAGNOSIS — E27.8 ADRENAL NODULE (MULTI): ICD-10-CM

## 2024-07-15 DIAGNOSIS — M25.611 DECREASED RANGE OF MOTION OF RIGHT SHOULDER: ICD-10-CM

## 2024-07-15 DIAGNOSIS — R93.1 AGATSTON CORONARY ARTERY CALCIUM SCORE GREATER THAN 400: ICD-10-CM

## 2024-07-15 DIAGNOSIS — E11.69 HYPERLIPIDEMIA ASSOCIATED WITH TYPE 2 DIABETES MELLITUS (MULTI): ICD-10-CM

## 2024-07-15 DIAGNOSIS — E78.5 HYPERLIPIDEMIA ASSOCIATED WITH TYPE 2 DIABETES MELLITUS (MULTI): ICD-10-CM

## 2024-07-15 DIAGNOSIS — E55.9 VITAMIN D INSUFFICIENCY: ICD-10-CM

## 2024-07-15 DIAGNOSIS — Z23 NEED FOR TDAP VACCINATION: ICD-10-CM

## 2024-07-15 DIAGNOSIS — E11.65 TYPE 2 DIABETES MELLITUS WITH HYPERGLYCEMIA, WITH LONG-TERM CURRENT USE OF INSULIN (MULTI): ICD-10-CM

## 2024-07-15 DIAGNOSIS — Z00.00 BLOOD TESTS FOR ROUTINE GENERAL PHYSICAL EXAMINATION: ICD-10-CM

## 2024-07-15 DIAGNOSIS — Z79.4 TYPE 2 DIABETES MELLITUS WITH HYPERGLYCEMIA, WITH LONG-TERM CURRENT USE OF INSULIN (MULTI): ICD-10-CM

## 2024-07-15 LAB — POC HEMOGLOBIN A1C: 8.6 % (ref 4.2–6.5)

## 2024-07-15 PROCEDURE — 83036 HEMOGLOBIN GLYCOSYLATED A1C: CPT | Performed by: INTERNAL MEDICINE

## 2024-07-15 PROCEDURE — 3078F DIAST BP <80 MM HG: CPT | Performed by: INTERNAL MEDICINE

## 2024-07-15 PROCEDURE — 3008F BODY MASS INDEX DOCD: CPT | Performed by: INTERNAL MEDICINE

## 2024-07-15 PROCEDURE — 3074F SYST BP LT 130 MM HG: CPT | Performed by: INTERNAL MEDICINE

## 2024-07-15 PROCEDURE — 4010F ACE/ARB THERAPY RXD/TAKEN: CPT | Performed by: INTERNAL MEDICINE

## 2024-07-15 PROCEDURE — 99215 OFFICE O/P EST HI 40 MIN: CPT | Performed by: INTERNAL MEDICINE

## 2024-07-15 RX ORDER — INSULIN GLARGINE 300 [IU]/ML
26 INJECTION, SOLUTION SUBCUTANEOUS NIGHTLY
Qty: 6 ML | Refills: 10 | Status: SHIPPED | OUTPATIENT
Start: 2024-07-15

## 2024-07-15 RX ORDER — LOSARTAN POTASSIUM 50 MG/1
25 TABLET ORAL DAILY
Qty: 45 TABLET | Refills: 3 | Status: SHIPPED | OUTPATIENT
Start: 2024-07-15

## 2024-07-15 RX ORDER — DAPAGLIFLOZIN AND METFORMIN HYDROCHLORIDE 5; 1000 MG/1; MG/1
2 TABLET, FILM COATED, EXTENDED RELEASE ORAL
Qty: 60 TABLET | Refills: 11 | Status: SHIPPED | OUTPATIENT
Start: 2024-07-15 | End: 2025-07-15

## 2024-07-15 ASSESSMENT — PAIN SCALES - GENERAL: PAINLEVEL: 0-NO PAIN

## 2024-10-03 ENCOUNTER — PATIENT MESSAGE (OUTPATIENT)
Dept: PRIMARY CARE | Facility: CLINIC | Age: 59
End: 2024-10-03
Payer: COMMERCIAL

## 2024-10-03 DIAGNOSIS — E78.5 HYPERLIPIDEMIA ASSOCIATED WITH TYPE 2 DIABETES MELLITUS (MULTI): ICD-10-CM

## 2024-10-03 DIAGNOSIS — E11.69 HYPERLIPIDEMIA ASSOCIATED WITH TYPE 2 DIABETES MELLITUS (MULTI): ICD-10-CM

## 2024-10-04 RX ORDER — ROSUVASTATIN CALCIUM 10 MG/1
10 TABLET, COATED ORAL DAILY
Qty: 90 TABLET | Refills: 3 | Status: SHIPPED | OUTPATIENT
Start: 2024-10-04

## 2024-10-28 ENCOUNTER — LAB (OUTPATIENT)
Dept: LAB | Facility: LAB | Age: 59
End: 2024-10-28
Payer: COMMERCIAL

## 2024-10-28 DIAGNOSIS — Z79.4 TYPE 2 DIABETES MELLITUS WITH HYPERGLYCEMIA, WITH LONG-TERM CURRENT USE OF INSULIN: ICD-10-CM

## 2024-10-28 DIAGNOSIS — I10 PRIMARY HYPERTENSION: ICD-10-CM

## 2024-10-28 DIAGNOSIS — E55.9 VITAMIN D INSUFFICIENCY: ICD-10-CM

## 2024-10-28 DIAGNOSIS — E11.65 TYPE 2 DIABETES MELLITUS WITH HYPERGLYCEMIA, WITH LONG-TERM CURRENT USE OF INSULIN: ICD-10-CM

## 2024-10-28 DIAGNOSIS — E27.9 ADRENAL NODULE: ICD-10-CM

## 2024-10-28 DIAGNOSIS — Z79.899 ENCOUNTER FOR MONITORING STATIN THERAPY: ICD-10-CM

## 2024-10-28 DIAGNOSIS — Z00.00 BLOOD TESTS FOR ROUTINE GENERAL PHYSICAL EXAMINATION: ICD-10-CM

## 2024-10-28 DIAGNOSIS — Z51.81 ENCOUNTER FOR MONITORING STATIN THERAPY: ICD-10-CM

## 2024-10-28 LAB
25(OH)D3 SERPL-MCNC: 29 NG/ML (ref 30–100)
ALBUMIN SERPL BCP-MCNC: 4.5 G/DL (ref 3.4–5)
ALP SERPL-CCNC: 44 U/L (ref 33–110)
ALT SERPL W P-5'-P-CCNC: 18 U/L (ref 7–45)
ANION GAP SERPL CALC-SCNC: 11 MMOL/L (ref 10–20)
AST SERPL W P-5'-P-CCNC: 16 U/L (ref 9–39)
BASOPHILS # BLD AUTO: 0.06 X10*3/UL (ref 0–0.1)
BASOPHILS NFR BLD AUTO: 0.8 %
BILIRUB SERPL-MCNC: 0.5 MG/DL (ref 0–1.2)
BUN SERPL-MCNC: 22 MG/DL (ref 6–23)
CALCIUM SERPL-MCNC: 10 MG/DL (ref 8.6–10.6)
CHLORIDE SERPL-SCNC: 104 MMOL/L (ref 98–107)
CHOLEST SERPL-MCNC: 143 MG/DL (ref 0–199)
CHOLESTEROL/HDL RATIO: 2.5
CK SERPL-CCNC: 73 U/L (ref 0–215)
CO2 SERPL-SCNC: 30 MMOL/L (ref 21–32)
CREAT SERPL-MCNC: 0.74 MG/DL (ref 0.5–1.05)
EGFRCR SERPLBLD CKD-EPI 2021: >90 ML/MIN/1.73M*2
EOSINOPHIL # BLD AUTO: 0.31 X10*3/UL (ref 0–0.7)
EOSINOPHIL NFR BLD AUTO: 4 %
ERYTHROCYTE [DISTWIDTH] IN BLOOD BY AUTOMATED COUNT: 11.6 % (ref 11.5–14.5)
EST. AVERAGE GLUCOSE BLD GHB EST-MCNC: 180 MG/DL
GLUCOSE SERPL-MCNC: 129 MG/DL (ref 74–99)
HBA1C MFR BLD: 7.9 %
HCT VFR BLD AUTO: 45.3 % (ref 36–46)
HDLC SERPL-MCNC: 56.4 MG/DL
HGB BLD-MCNC: 14.8 G/DL (ref 12–16)
IMM GRANULOCYTES # BLD AUTO: 0.01 X10*3/UL (ref 0–0.7)
IMM GRANULOCYTES NFR BLD AUTO: 0.1 % (ref 0–0.9)
LDLC SERPL CALC-MCNC: 61 MG/DL
LYMPHOCYTES # BLD AUTO: 3.53 X10*3/UL (ref 1.2–4.8)
LYMPHOCYTES NFR BLD AUTO: 45.8 %
MCH RBC QN AUTO: 29.4 PG (ref 26–34)
MCHC RBC AUTO-ENTMCNC: 32.7 G/DL (ref 32–36)
MCV RBC AUTO: 90 FL (ref 80–100)
MONOCYTES # BLD AUTO: 0.44 X10*3/UL (ref 0.1–1)
MONOCYTES NFR BLD AUTO: 5.7 %
NEUTROPHILS # BLD AUTO: 3.36 X10*3/UL (ref 1.2–7.7)
NEUTROPHILS NFR BLD AUTO: 43.6 %
NON HDL CHOLESTEROL: 87 MG/DL (ref 0–149)
NRBC BLD-RTO: 0 /100 WBCS (ref 0–0)
PLATELET # BLD AUTO: 264 X10*3/UL (ref 150–450)
POTASSIUM SERPL-SCNC: 4.3 MMOL/L (ref 3.5–5.3)
PROT SERPL-MCNC: 7.3 G/DL (ref 6.4–8.2)
RBC # BLD AUTO: 5.03 X10*6/UL (ref 4–5.2)
SODIUM SERPL-SCNC: 141 MMOL/L (ref 136–145)
TRIGL SERPL-MCNC: 129 MG/DL (ref 0–149)
TSH SERPL-ACNC: 1.71 MIU/L (ref 0.44–3.98)
VIT B12 SERPL-MCNC: 587 PG/ML (ref 211–911)
VLDL: 26 MG/DL (ref 0–40)
WBC # BLD AUTO: 7.7 X10*3/UL (ref 4.4–11.3)

## 2024-10-28 PROCEDURE — 36415 COLL VENOUS BLD VENIPUNCTURE: CPT

## 2024-10-28 PROCEDURE — 84443 ASSAY THYROID STIM HORMONE: CPT

## 2024-10-28 PROCEDURE — 80061 LIPID PANEL: CPT

## 2024-10-28 PROCEDURE — 82607 VITAMIN B-12: CPT

## 2024-10-28 PROCEDURE — 82550 ASSAY OF CK (CPK): CPT

## 2024-10-28 PROCEDURE — 80053 COMPREHEN METABOLIC PANEL: CPT

## 2024-10-28 PROCEDURE — 82306 VITAMIN D 25 HYDROXY: CPT

## 2024-10-28 PROCEDURE — 83036 HEMOGLOBIN GLYCOSYLATED A1C: CPT

## 2024-10-28 PROCEDURE — 82088 ASSAY OF ALDOSTERONE: CPT

## 2024-10-28 PROCEDURE — 85025 COMPLETE CBC W/AUTO DIFF WBC: CPT

## 2024-10-29 LAB — ALDOSTERONE IN SERUM: 9.4 NG/DL

## 2024-10-29 NOTE — PROGRESS NOTES
Patient ID: Allyson Santiago is a 59 y.o. female who presents for Follow-up (Pt here for FUV DM. Pt denies any new problems or concerns at this time. )  LAST VISIT PLAN FROM: 07/15/2024:  Patient Instructions:  Fasting blood test October 15  Instructions for obtaining lab tests:   You do not need a paper requisition when obtaining tests at a  facility. No appointment is needed for lab tests.  For fasting blood tests:  Please do not consume calories for 10-12 hours prior to this blood test. It is ok to drink water, you should be hydrated.  Please do not take vitamins, supplements or thyroid medication before your blood is drawn this day.   Most lab results should be available for your review on the  My Chart portal within 48 hours. Please contact the office if you have not received results within 2 weeks of obtaining the test.  Cologuard or colonoscopy recommended November 29 2024 or soon after-order is in for cologuard.  PT for right shoulder.  Ice for 20 minutes after playing golf or tennis  Voltaren gel 2 grams at least twice per day, can do 4 times per day.  Xray  if not improving  Follow up November as planned.  A1c is 8.6  Change xidguo to 5mg/1000 mg Er 2 tabs once per day in the am with breakfast.  You need a pneumovax  You need a tdap.  END LAST VISIT PLAN  -------------------------------------------  HPI  Patient is a 59-year-old female who presents today for follow up.  She denies any concerns at this time.    An XR Right Shoulder was ordered but has not been completed.    Hypertension:  Follow up on cardiovascular conditions:  Cardiac:   Chest pain?No  Palpitations? No  Increased lebron?  No  Other:  Resp:   Shortness of breath?No  Wheezing No  Cough No  Other:  Extremities:   Leg or ankle swelling?No   Claudication?No  Other:  Neuro:  DizzinessNo  SyncopeNo   Blurred visionNo  New headaches No  Other:  Medications:   Current Outpatient Medications   Medication Instructions    BD Ultra-Fine Short Pen  "Needle 31 gauge x 5/16\" needle     dapaglifloz propaned-metformin (Xigduo XR) 5-1,000 mg 2 tablets, oral, Daily with breakfast    Dexcom G7 Sensor device APPLY DEVICE TO POSTERIOR ARM AND LEAVE FOR 10 DAYS. USE REMOTE TO SCAN AND REVIEW SUGARS    insulin glargine (TOUJEO MAX U-300 SOLOSTAR) 16 Units, subcutaneous, Nightly, Or Take as directed per insulin instructions    insulin lispro (HumaLOG KwikPen Insulin) 200 unit/mL (3 mL) insulin pen pen Take as directed per insulin instructions. 5-10 units with each meal based on expected carb count.    losartan (COZAAR) 25 mg, oral, Daily, 3 times per week    OneTouch Delica Plus Lancet 33 gauge misc TAKE 1 APPLICATION AS DIRECTED 3 TIMES PER DAY FOR 90 DAYS E11.9 NON INSULIN PT TESTS 3 X DAILY    OneTouch Verio test strips strip USE THREE TIMES DAILY    rosuvastatin (CRESTOR) 10 mg, oral, Daily      Taking them regularly.Yes  Side effects.No  We discussed intermittent dosing of losartan 25 mg at 3 times per week -she prefers this, points out that she has no microablumin, and she drops her bp if she takes it every day. Discussed taking 12.5 daily or changing to low dose olmesartan but she preferred taking med this way.  Patient reports no change in her exercise tolerance and has completed a stress test with no significant findings.    Hyperlipidemia: and CAC score over 400:   Neg stress test, saw cardiology who said no need to come back unless symptoms per pt  No myalgias, nausea, abdominal pain.  Meds: Taking regularly, no adverse effect.  Patient continues on rosuvastatin 10 mg daily.  Labs: Last LDL calculated was 61 mg/dL on 10/28/2024.  LDL goal: <70 mg/dL.: is at goal, continue present dose.    Diabetes:  Type II:  Last glucose was 129 mg/dL with A1c at 7.9% on 10/28/2024.this is improved from the A1c of 8.6 prior. Ideal is below 7.  Is taking medications regularly, denies side effects.  Patient continues on Xigduo XR 5-1000 mg X2 daily.  Patient reports diarrhea if " she does not take it with food.  Resolves with taking medication with food.  Patient reports taking 1 tablet twice per day but will skip her evening dose if she does not have dinner.  She will take 16 units of insulin at night.  She was initially on 26 units of insulin at night but was able to drop to 16 units with the addition of the second Xigduo XR daily.  She does not tolerate taking 2 tabs in the am.Of note, she does report intermittent hypoglycemic episodes after exercise and occasionally at night. Her symptoms are hunger and her sugar is usually about 100 when this occurs.  Denies severe hypoglycemia, no complaints of polyuria, polydipsia.  No new numbness or paresthesias of extremities. No syncope or dizziness. No blurred vision.-does have retinopathy and follows with opht  Lab Results   Component Value Date    HGBA1C 7.9 (H) 10/28/2024    HGBA1C 8.6 (A) 07/15/2024    HGBA1C 7.8 (A) 03/11/2024     Lab Results   Component Value Date    LDLCALC 61 10/28/2024    CREATININE 0.74 10/28/2024      Lab Results   Component Value Date    GLUCOSE 129 (H) 10/28/2024    CALCIUM 10.0 10/28/2024     10/28/2024    K 4.3 10/28/2024    CO2 30 10/28/2024     10/28/2024    BUN 22 10/28/2024    CREATININE 0.74 10/28/2024       Vitamin D Insufficiency:  Last vitamin D was 29 at 10/28/2024.  We discussed supplementation. Patient will take 1000 international units, intermittently.  We discussed taking 1000 international units daily, especially, in the winter months. She does not believe it has any cardiovascular benefits. Discussed at minimum is important for it to be normal to protect bone health.    Anxiety:  Patient reports increased anxiety with stress related to work and changes that are being made.    We discussed recommended vaccines including T-dap, Covid booster and influenza vaccine.  Patient received her influenza vaccine on 10/23/2024.    Order placed for refill as required.  Continue same  "medications.    Follow up 03/17/2025.    Review of Systems  See ROS in HPI    Current Outpatient Medications   Medication Instructions    BD Ultra-Fine Short Pen Needle 31 gauge x 5/16\" needle     dapaglifloz propaned-metformin (Xigduo XR) 5-1,000 mg 2 tablets, oral, Daily with breakfast    Dexcom G7 Sensor device APPLY DEVICE TO POSTERIOR ARM AND LEAVE FOR 10 DAYS. USE REMOTE TO SCAN AND REVIEW SUGARS    insulin glargine (TOUJEO MAX U-300 SOLOSTAR) 16 Units, subcutaneous, Nightly, Or Take as directed per insulin instructions    insulin lispro (HumaLOG KwikPen Insulin) 200 unit/mL (3 mL) insulin pen pen Take as directed per insulin instructions. 5-10 units with each meal based on expected carb count.    losartan (COZAAR) 25 mg, oral, Daily, 3 times per week    OneTouch Delica Plus Lancet 33 gauge misc TAKE 1 APPLICATION AS DIRECTED 3 TIMES PER DAY FOR 90 DAYS E11.9 NON INSULIN PT TESTS 3 X DAILY    OneTouch Verio test strips strip USE THREE TIMES DAILY    rosuvastatin (CRESTOR) 10 mg, oral, Daily     Allergies   Allergen Reactions    Metformin Other     Abdominal pain    Ozempic [Semaglutide] Diarrhea and Other     Nausea, Vomiting, diarrhea  Rybelsus caused diarrhea, lost 10 pds    Tresiba Flextouch U-100 [Insulin Degludec] Other     Bloating, weight gain    Victoza 2-Cleve [Liraglutide] Other     nausea     Objective    Results reviewed:   Latest Complete Lab Results:  CBC:   Lab Results   Component Value Date    WBC 7.7 10/28/2024    RBC 5.03 10/28/2024    HGB 14.8 10/28/2024    HCT 45.3 10/28/2024    MCV 90 10/28/2024    MCH 29.4 10/28/2024    MCHC 32.7 10/28/2024     10/28/2024         CMP:  Lab Results   Component Value Date    GLUCOSE 129 (H) 10/28/2024     10/28/2024    K 4.3 10/28/2024     10/28/2024    CO2 30 10/28/2024    ANIONGAP 11 10/28/2024    BUN 22 10/28/2024    CREATININE 0.74 10/28/2024    GFRF >90 09/08/2023    CALCIUM 10.0 10/28/2024    ALBUMIN 4.5 10/28/2024    ALKPHOS 44 " "10/28/2024    PROT 7.3 10/28/2024    AST 16 10/28/2024    BILITOT 0.5 10/28/2024    ALT 18 10/28/2024          Lipid:   Lab Results   Component Value Date    CHOL 143 10/28/2024    HDL 56.4 10/28/2024    CHHDL 2.5 10/28/2024    LDLF 84 09/08/2023    VLDL 26 10/28/2024    TRIG 129 10/28/2024       TSH:   Lab Results   Component Value Date    TSH 1.71 10/28/2024        B12:  Lab Results   Component Value Date    UZKUNZLC94 587 10/28/2024       Vitamin D:  Lab Results   Component Value Date    VITD25 29 (L) 10/28/2024        HgA1c:   Lab Results   Component Value Date    HGBA1C 7.9 (H) 10/28/2024    [id number] 180 10/28/2024       Physical Exam  Vitals:      11/6/2023    11:38 AM 3/11/2024    10:44 AM 3/11/2024    12:29 PM 7/15/2024     8:07 AM 7/15/2024     8:49 AM 7/15/2024     8:50 AM 11/11/2024     8:23 AM   Vitals   Systolic 104 142 120 148 121 126 112   Diastolic 56 82 65 101 68 64 68   Heart Rate 84 84 68 98   84   Resp 18 18     18   Height (in) 1.651 m (5' 5\") 1.676 m (5' 6\")  1.651 m (5' 5\")   1.676 m (5' 6\")   Weight (lb) 162 164  162   160   BMI 26.96 kg/m2 26.47 kg/m2  26.96 kg/m2   25.82 kg/m2   BSA (m2) 1.84 m2 1.86 m2  1.84 m2   1.84 m2   Visit Report Report Report Report Report Report Report Report     Patient looks well and is in no obvious distress. She is always pleasant.  HEENT:   Normocephalic, no facial asymmetry  Eyes: Sclera and conjunctiva are clear.  Neck: No adenopathy cervical (Ant/post/lat), no Supraclavicular nodes.   Thyroid normal.  Carotid pulses normal, no carotid bruits.  Lungs : RR normal. Clear to auscultation anterior, posterior and lateral. No rales, wheezes rhonchi or rubs. Good air exchange.  Heart: RRR. No Murmur, gallop, click or rub.  Abdomen: Bowel sounds normal, No bruits. No pulsatile mass. No hepatosplenomegaly, masses or tenderness. Soft, no guarding.  Vascular:  Posterior tibialis and dorsalis pedis pulses within normal limits bilaterally.   Extremities: No upper " extremity edema. No lower extremity edema.   Musculoskeletal: No synovitis of joints seen. No new deformity.  Neuro: CN 2-12 intact. Alert, appropriate.  Ambulates independently.  No gross motor deficit.   No tremors.  Psych: normal mood and affect.  Skin: No rash, bruising petechiae or jaundice.    Allyson was seen today for follow-up.  Diagnoses and all orders for this visit:  Immunization counseling (Primary)  Type 2 diabetes mellitus with hyperglycemia, with long-term current use of insulin  Primary hypertension  Hyperlipidemia associated with type 2 diabetes mellitus (Multi)  Adrenal nodule  Comments:  negative metabolic work up  Agatston coronary artery calcium score greater than 400  Vitamin D insufficiency  Other orders  -     insulin glargine (Toujeo Max U-300 SoloStar) 300 unit/mL (3 mL) injection; Inject 16 Units under the skin once daily at bedtime. Or Take as directed per insulin instructions         See patient instructions in wrap up plan, orders and comments for treatment plan.  Patient Instructions:  Vitamin  D 1000 international units regularly daily .   TDAP still recommended. Can do here or at a pharmacy.    Same medications.     Follow up March as planned, sooner if needed.      Scribe Attestation  By signing my name below, Valorie COKER, [employer]   attest that this documentation has been prepared under the direction and in the presence of Parris Knowles MD.    no discharge, no irritation, no pain, no redness, and no visual changes.

## 2024-11-11 ENCOUNTER — APPOINTMENT (OUTPATIENT)
Dept: PRIMARY CARE | Facility: CLINIC | Age: 59
End: 2024-11-11
Payer: COMMERCIAL

## 2024-11-11 VITALS
HEIGHT: 66 IN | WEIGHT: 160 LBS | HEART RATE: 84 BPM | BODY MASS INDEX: 25.71 KG/M2 | RESPIRATION RATE: 18 BRPM | SYSTOLIC BLOOD PRESSURE: 112 MMHG | DIASTOLIC BLOOD PRESSURE: 68 MMHG

## 2024-11-11 DIAGNOSIS — E11.65 TYPE 2 DIABETES MELLITUS WITH HYPERGLYCEMIA, WITH LONG-TERM CURRENT USE OF INSULIN: ICD-10-CM

## 2024-11-11 DIAGNOSIS — Z79.4 TYPE 2 DIABETES MELLITUS WITH HYPERGLYCEMIA, WITH LONG-TERM CURRENT USE OF INSULIN: ICD-10-CM

## 2024-11-11 DIAGNOSIS — I10 PRIMARY HYPERTENSION: ICD-10-CM

## 2024-11-11 DIAGNOSIS — R93.1 AGATSTON CORONARY ARTERY CALCIUM SCORE GREATER THAN 400: Chronic | ICD-10-CM

## 2024-11-11 DIAGNOSIS — Z71.85 IMMUNIZATION COUNSELING: Primary | ICD-10-CM

## 2024-11-11 DIAGNOSIS — E11.69 HYPERLIPIDEMIA ASSOCIATED WITH TYPE 2 DIABETES MELLITUS (MULTI): ICD-10-CM

## 2024-11-11 DIAGNOSIS — E78.5 HYPERLIPIDEMIA ASSOCIATED WITH TYPE 2 DIABETES MELLITUS (MULTI): ICD-10-CM

## 2024-11-11 DIAGNOSIS — E55.9 VITAMIN D INSUFFICIENCY: ICD-10-CM

## 2024-11-11 DIAGNOSIS — E27.9 ADRENAL NODULE: ICD-10-CM

## 2024-11-11 PROCEDURE — 3078F DIAST BP <80 MM HG: CPT | Performed by: INTERNAL MEDICINE

## 2024-11-11 PROCEDURE — 99214 OFFICE O/P EST MOD 30 MIN: CPT | Performed by: INTERNAL MEDICINE

## 2024-11-11 PROCEDURE — 4010F ACE/ARB THERAPY RXD/TAKEN: CPT | Performed by: INTERNAL MEDICINE

## 2024-11-11 PROCEDURE — 1036F TOBACCO NON-USER: CPT | Performed by: INTERNAL MEDICINE

## 2024-11-11 PROCEDURE — 3048F LDL-C <100 MG/DL: CPT | Performed by: INTERNAL MEDICINE

## 2024-11-11 PROCEDURE — 3051F HG A1C>EQUAL 7.0%<8.0%: CPT | Performed by: INTERNAL MEDICINE

## 2024-11-11 PROCEDURE — 3074F SYST BP LT 130 MM HG: CPT | Performed by: INTERNAL MEDICINE

## 2024-11-11 PROCEDURE — 3008F BODY MASS INDEX DOCD: CPT | Performed by: INTERNAL MEDICINE

## 2024-11-11 RX ORDER — INSULIN GLARGINE 300 [IU]/ML
16 INJECTION, SOLUTION SUBCUTANEOUS NIGHTLY
Status: SHIPPED
Start: 2024-11-11

## 2024-11-11 ASSESSMENT — PATIENT HEALTH QUESTIONNAIRE - PHQ9
1. LITTLE INTEREST OR PLEASURE IN DOING THINGS: NOT AT ALL
SUM OF ALL RESPONSES TO PHQ9 QUESTIONS 1 AND 2: 0
2. FEELING DOWN, DEPRESSED OR HOPELESS: NOT AT ALL

## 2024-11-11 ASSESSMENT — PAIN SCALES - GENERAL: PAINLEVEL_OUTOF10: 0-NO PAIN

## 2024-11-11 NOTE — PATIENT INSTRUCTIONS
Vitamin  D 1000 international units regularly daily .   TDAP still recommended. Can do here or at a pharmacy.    Same medications.     Follow up March as planned, sooner if needed.

## 2024-11-20 ENCOUNTER — PATIENT MESSAGE (OUTPATIENT)
Dept: PRIMARY CARE | Facility: CLINIC | Age: 59
End: 2024-11-20
Payer: COMMERCIAL

## 2024-12-10 ENCOUNTER — PATIENT MESSAGE (OUTPATIENT)
Dept: PRIMARY CARE | Facility: CLINIC | Age: 59
End: 2024-12-10
Payer: COMMERCIAL

## 2024-12-10 DIAGNOSIS — Z79.4 TYPE 2 DIABETES MELLITUS WITH HYPERGLYCEMIA, WITH LONG-TERM CURRENT USE OF INSULIN: ICD-10-CM

## 2024-12-10 DIAGNOSIS — E11.65 TYPE 2 DIABETES MELLITUS WITH HYPERGLYCEMIA, WITH LONG-TERM CURRENT USE OF INSULIN: ICD-10-CM

## 2024-12-11 RX ORDER — INSULIN LISPRO 200 [IU]/ML
INJECTION, SOLUTION SUBCUTANEOUS
Qty: 3 ML | Refills: 11 | Status: SHIPPED | OUTPATIENT
Start: 2024-12-11

## 2024-12-13 LAB — NONINV COLON CA DNA+OCC BLD SCRN STL QL: NEGATIVE

## 2025-03-09 NOTE — PROGRESS NOTES
"Subjective   Patient ID: Allyson Santiago is a 59 y.o. female who presents for Annual Physical and follow up on conditions.    (LAST VISIT PLAN FROM: 11/11/2024:  Patient Instructions:  Vitamin  D 1000 international units regularly daily .   TDAP still recommended. Can do here or at a pharmacy.  Same medications.   Follow up March as planned, sooner if needed.  END INFO FROM PRIOR VISIT)  -------------------------------------------  HPI  Health maintenance items last completed:  Colonoscopy: No colonoscopy on file.  Mammogram: 12/19/2024; There is no mammographic evidence of malignancy in either breast. Routine screening mammogram is recommended. Annual mammogram will be due in 1 year.  BI-RADS Category 1: Negative. Due 12/19/2025.  Bone Density: N/A.  Urine ACR (albumin creatinine ratio) or microalbumin: if HTN or DM2: Albumin in urine was 30 on 03/11/2024.  Pap: Declined at last visit 03/11/2024.  Declines Pap today.  She states that is has been years since she had a Pap (approximately in her 30s).  Encouraged to consider an appointment for Pap test for cervical cancer screening.  Patient states she is \"fine\".  Pelvic:  Declined at last visit 03/11/2024.  Breast exam in office:  Today. Declines today. Advised to do self-breast exams regularly.  Patient in agreement.  Vaccines due or not completed: Influenza completed 10/23/2024.  P-23 completed 07/16/2024.  No T-dap on file.  No recent Covid booster.  Patient received her tetanus vaccine today in office.  Last Health Maintenance exam: 03/11/2024.    Patient has no concerns at this time.    I reviewed and updated her family medical history and surgical history and updated her chart.    Recommended obtaining the tetanus vaccine.  Patient will receive today in office.    Hypertension:  BP today is 145/79.  Recheck by me was 135/68.  Patient will check her BP on a wrist cuff reporting systolic readings at 130 to 150.  Recommended obtaining an Omron or A&D BP cuff " "arm, plug in.  Follow up on cardiovascular conditions:  Patient continues on losartan 25 mg three times per week.  She states that she has not taken her medication today. We discussed alternate medication (olmesartan) to lower her BP with goal of 130/80 or taking her losartan daily instead of 3 times per week.  Patient is reluctant starting new medication.  Advised to take losartan 25 mg daily.  Patient is in agreement.  Advised to hydrate well and monitor BP at home.   Cardiac:   Chest pain?No  Palpitations? No  Increased lebron?  No  Other:  Resp:   Shortness of breath?No  Wheezing No  Cough No  Other:  Extremities:   Leg or ankle swelling?No   Claudication?No  Other:  Neuro:  DizzinessNo  SyncopeNo   Blurred visionNo  New headaches No  Other:  Medications:Taking them regularly.Yes  Side effects.No    Hyperlipidemia:  No myalgias, nausea, abdominal pain.  Meds: Taking regularly, no adverse effect.  Patient continues on rosuvastatin 10 mg daily.  Labs:  Last LDL calculated was 61 mg/dL on 10/28/2024.   LDL goal: < 70 mg/dL.  Coronary artery calcium score of 421.96 on 06/02/2023.  ASCVD risk 5.6%.    Diabetes:  Type II:  A1c improving.  A1c was 7.9% on 10/28/2024 compared to 8.6% on 07/15/2024.  Order placed for repeat A1c.  Patient reports that her sugars have been \"fine\".  She states her Dexcom says 7.6% for A1c.  She states she is using less Humalog and stopped eating Chinese food.  She reports that rice will elevate her blood sugar.  Is taking medications regularly, denies side effects.  Patient continues on Xigduo XR 5-1000 mg X2 daily as well as Toujeo 16 units at bedtime and Humalog 5-10 units with each meal.    Denies hypoglycemia, polyuria, polydipsia.  No new numbness or paresthesias of extremities. No syncope or dizziness. No blurred vision.  Lab Results   Component Value Date    HGBA1C 7.9 (H) 10/28/2024    HGBA1C 8.6 (A) 07/15/2024    HGBA1C 7.8 (A) 03/11/2024     Lab Results   Component Value Date    " "LDLCALC 61 10/28/2024    CREATININE 0.74 10/28/2024      Lab Results   Component Value Date    GLUCOSE 129 (H) 10/28/2024    CALCIUM 10.0 10/28/2024     10/28/2024    K 4.3 10/28/2024    CO2 30 10/28/2024     10/28/2024    BUN 22 10/28/2024    CREATININE 0.74 10/28/2024      No results found for this or any previous visit (from the past 4464 hours).     Continuous Glucose Monitor Data:  Duration (days) %Above Target % In Range %Low     Average glucose Usage rate/scans per day    Set Target Range (low-high)  7 days            14 days    30 days    38%   62%    90 days   46%   54%  Any lows symptomatic? No     Patient has tried some GLP-1 medications in the past with adverse side effects including nausea and diarrhea.  She was taking metformin at that time as well.  Recommended trying Mounjaro.  Recommended taking scopolamine patch if nausea occurs. Patient will consider and let me know.    Patient reports 2 UTIs with taking the SGLT-2 medication.    Orders placed for fasting blood work as required to be completed prior to next visit as well as A1c today at the lab.  Patient is fasting this morning.    Orders placed for prescriptions as required.    Follow up 10/13/2025.    Review of Systems  All systems reviewed and are negative unless otherwise stated in HPI or noted in writing on the written   3  page history and review of systems document reviewed by me and  scanned in with this visit. This is scanned either to notes or to media, with today's date.    Current Outpatient Medications   Medication Instructions    azelastine (Optivar) 0.05 % ophthalmic solution 1 drop, Both Eyes, 2 times daily PRN    BD Ultra-Fine Short Pen Needle 31 gauge x 5/16\" needle     dapaglifloz propaned-metformin (Xigduo XR) 5-1,000 mg 2 tablets, oral, Daily with breakfast    Dexcom G7 Sensor device APPLY DEVICE TO POSTERIOR ARM AND LEAVE FOR 10 DAYS. USE REMOTE TO SCAN AND REVIEW SUGARS    insulin glargine (TOUJEO MAX U-300 " SOLOSTAR) 16 Units, subcutaneous, Nightly, Or Take as directed per insulin instructions    insulin lispro (HumaLOG KwikPen Insulin) 200 unit/mL (3 mL) insulin pen pen Take as directed per insulin instructions. 5-10 units with each meal based on expected carb count.    losartan (COZAAR) 25 mg, oral, Daily, 3 times per week    mometasone (Nasonex) 50 mcg/actuation nasal spray 1 spray, Each Nostril, 2 times daily, For 30 days and then continue if needed    OneTouch Delica Plus Lancet 33 gauge misc TAKE 1 APPLICATION AS DIRECTED 3 TIMES PER DAY FOR 90 DAYS E11.9 NON INSULIN PT TESTS 3 X DAILY    OneTouch Verio test strips strip USE THREE TIMES DAILY    rosuvastatin (CRESTOR) 10 mg, oral, Daily     Allergies   Allergen Reactions    Metformin Other     Abdominal pain    Ozempic [Semaglutide] Diarrhea and Other     Nausea, Vomiting, diarrhea  Rybelsus caused diarrhea, lost 10 pds    Tresiba Flextouch U-100 [Insulin Degludec] Other     Bloating, weight gain    Trulicity [Dulaglutide] Nausea/vomiting    Victoza 2-Cleve [Liraglutide] Other     nausea     Objective   The following test results have been reviewed:  Latest Complete Lab Results:  CBC:   Lab Results   Component Value Date    WBC 7.7 10/28/2024    RBC 5.03 10/28/2024    HGB 14.8 10/28/2024    HCT 45.3 10/28/2024    MCV 90 10/28/2024    MCH 29.4 10/28/2024    MCHC 32.7 10/28/2024     10/28/2024       CBC w/Diff:   Lab Results   Component Value Date    WBC 7.7 10/28/2024    NRBC 0.0 10/28/2024    RBC 5.03 10/28/2024    HGB 14.8 10/28/2024    HCT 45.3 10/28/2024    MCV 90 10/28/2024    MCHC 32.7 10/28/2024     10/28/2024    RDW 11.6 10/28/2024    NEUTOPHILPCT 43.6 10/28/2024    IGPCT 0.1 10/28/2024    LYMPHOPCT 45.8 10/28/2024    MONOPCT 5.7 10/28/2024    EOSPCT 4.0 10/28/2024    BASOPCT 0.8 10/28/2024    NEUTROABS 3.36 10/28/2024    LYMPHSABS 3.53 10/28/2024    MONOSABS 0.44 10/28/2024    EOSABS 0.31 10/28/2024    BASOSABS 0.06 10/28/2024        CMP:  Lab  "Results   Component Value Date    GLUCOSE 129 (H) 10/28/2024     10/28/2024    K 4.3 10/28/2024     10/28/2024    CO2 30 10/28/2024    ANIONGAP 11 10/28/2024    BUN 22 10/28/2024    CREATININE 0.74 10/28/2024    GFRF >90 09/08/2023    CALCIUM 10.0 10/28/2024    ALBUMIN 4.5 10/28/2024    ALKPHOS 44 10/28/2024    PROT 7.3 10/28/2024    AST 16 10/28/2024    BILITOT 0.5 10/28/2024    ALT 18 10/28/2024        BMP:  Lab Results   Component Value Date    GLUCOSE 129 (H) 10/28/2024    BUN 22 10/28/2024    CREATININE 0.74 10/28/2024     10/28/2024    K 4.3 10/28/2024     10/28/2024    CO2 30 10/28/2024    ANIONGAP 11 10/28/2024    CALCIUM 10.0 10/28/2024    EGFR >90 10/28/2024        Lipid:   Lab Results   Component Value Date    CHOL 143 10/28/2024    HDL 56.4 10/28/2024    CHHDL 2.5 10/28/2024    LDLF 84 09/08/2023    VLDL 26 10/28/2024    TRIG 129 10/28/2024       LDL Direct:  No results found for: \"LDLDIRECT\"     TSH:   Lab Results   Component Value Date    TSH 1.71 10/28/2024        B12:  Lab Results   Component Value Date    ICRDTRFP14 587 10/28/2024       Vitamin D:  Lab Results   Component Value Date    VITD25 29 (L) 10/28/2024        HgA1c:   Lab Results   Component Value Date    HGBA1C 7.9 (H) 10/28/2024    [id number] 180 10/28/2024       Physical Exam      7/15/2024     8:07 AM 7/15/2024     8:49 AM 7/15/2024     8:50 AM 11/11/2024     8:23 AM 3/17/2025     9:14 AM 3/17/2025    10:42 AM 3/17/2025    10:44 AM   Vitals   Systolic 148 121 126 112 145 135    Diastolic 101 68 64 68 79 68    BP Location  Left arm Right arm       Heart Rate 98   84 85     Resp    18 16     Height 1.651 m (5' 5\")   1.676 m (5' 6\") 1.505 m (4' 11.25\")  1.702 m (5' 7\")   Weight (lb) 162   160 162.2     BMI 26.96 kg/m2   25.82 kg/m2 32.48 kg/m2  25.4 kg/m2   BSA (m2) 1.84 m2   1.84 m2 1.75 m2  1.87 m2     General: Patient is known to me, is in their  usual state of health, with  no obvious distress.  Head: " Normocephalic  Eyes: PERRL, EOMI, no scleral icterus or conjunctival abnormality.  Ears: Bilateral EACs and TMs are dry and retracted.  Nose: Pale, boggy turbinates present consistent with allergic rhinitis.  Patient is known to have allergies and reports decreased hearing. She reports having itchy eyes.  Recommended Nasonex nasal spray 2 sprays each nostril daily for at least 1 month and Optivar opthalmic solution, 1 drop two times daily PRN.  Recommended hearing evaluation with Audiology.  Oropharynx: Well hydrated mucosa. no lesions, erythema. palate moves well.  Post nasal drip present.  Neck: No masses, no cervical (A/P/ or Lateral) adenopathy. Thyroid not enlarged and no nodules. Carotid pulses normal and no bruits.  Chest: Normal AP diameter. No supraclavicular adenopathy.  Lungs: Clear to auscultation: no rales , wheezes, rhonchi, rubs, examined bilaterally, ant/post /lateral. RR normal.  Heart: RRR. No MGCR S1 S2 normal.  Abdomen: BS normal, no bruits. No hepatosplenomegaly. No abdominal mass or tenderness. No distension.  Extremities: No upper extremity edema. No lower leg edema.  No ischemia.   Joints: No synovitis seen. No deformities.  Pulses: Normal posterior tibialis pulses, normal dorsalis pedis pulses, bilaterally. Normal radial pulses. Normal femoral pulses without bruits.  Neuro: CN 2-12 intact . Alert, oriented, appropriate.  No focal weakness observed. No tremors. Ambulation is normal .  Psych: Mood and affect normal. No cognitive deficits noted during this exam. Alert, oriented, appropriate.  Skin: No rash, petechiae or excessive bruising.  Lymph: No SC axillary or inguinal adenopathy  Breast exam declined by patient, states will do her own.  Diabetic Foot Exam:  Skin normal.  No lesions.  DP and PT pulses within normal limits.  Callus: None  Deformities: None.  Monofilament Testing: Normal.  Allyson was seen today for annual exam.  Diagnoses and all orders for this visit:  Encounter for  annual physical exam (Primary)  Type 2 diabetes mellitus with hyperglycemia, with long-term current use of insulin  -     Basic metabolic panel; Future  -     Basic metabolic panel  -     Hemoglobin A1c; Future  -     Hemoglobin A1c  Primary hypertension  Hyperlipidemia associated with type 2 diabetes mellitus (Multi)  Encounter for diabetic foot exam (Multi)  Comments:  excellent dp pulse, normal exam just not able to palpate PT pulses  Adrenal nodule  Seasonal allergic conjunctivitis  -     azelastine (Optivar) 0.05 % ophthalmic solution; Administer 1 drop into both eyes 2 times a day as needed (eye allergy symptoms).  Seasonal allergic rhinitis, unspecified trigger  -     mometasone (Nasonex) 50 mcg/actuation nasal spray; Administer 1 spray into each nostril 2 times a day. For 30 days and then continue if needed  Retracted tympanic membrane, bilateral  Need for diphtheria-tetanus-pertussis (Tdap) vaccine  -     Tdap vaccine, age 7 years and older  (BOOSTRIX)        See patient instructions in wrap up plan, orders and comments for treatment plan.  Patient Instructions:  Consider trying mounjaro samples-could use scopolamine patch when taking med if it causes nausea. Please let me know if you start these/how it goes.    Start taking losartan 25 mg daily. Hydrate well.   Goal bp is under 130/80.  Let me know if you want to try olmesartan low dose instead.  Recommend omron bp cuff with AC adapter.  Home monitor bp.    Allergic rhinitis recommend nasonex/mometasone nasal spray 1  spray each nostril twice daily  for at least  a month, see if helps your hearing, ears.  Optivar eye drops as needed for allergic conjunctivitis    Suggest hearing evaluation/audiometry.    Please do self breast examination.  Consider appointment for pap test for cervical cancer screening.    Blood test today or soon for bmp and A1c .    Follow up 6 months sooner if needed. Fasting blood test before that visit (complete).    TDAP vaccine  given today, next routine booster in 10 years.  Would do an A1c at 3 months however.  I am the Internal Medicine physician providing ongoing chronic medical care for this patient, which is managed during and in between office visits.    Scribe Attestation  By signing my name below, IValorie Scribe   attest that this documentation has been prepared under the direction and in the presence of Parris Knowles MD.   Time: 58 min

## 2025-03-13 DIAGNOSIS — Z79.4 TYPE 2 DIABETES MELLITUS WITH HYPERGLYCEMIA, WITH LONG-TERM CURRENT USE OF INSULIN: ICD-10-CM

## 2025-03-13 DIAGNOSIS — E11.69 HYPERLIPIDEMIA ASSOCIATED WITH TYPE 2 DIABETES MELLITUS (MULTI): ICD-10-CM

## 2025-03-13 DIAGNOSIS — I25.10 CALCIFICATION OF CORONARY ARTERY: ICD-10-CM

## 2025-03-13 DIAGNOSIS — E11.69 TYPE 2 DIABETES MELLITUS WITH OTHER SPECIFIED COMPLICATION, WITH LONG-TERM CURRENT USE OF INSULIN: ICD-10-CM

## 2025-03-13 DIAGNOSIS — E11.65 TYPE 2 DIABETES MELLITUS WITH HYPERGLYCEMIA, WITH LONG-TERM CURRENT USE OF INSULIN: ICD-10-CM

## 2025-03-13 DIAGNOSIS — Z79.4 TYPE 2 DIABETES MELLITUS WITH OTHER SPECIFIED COMPLICATION, WITH LONG-TERM CURRENT USE OF INSULIN: ICD-10-CM

## 2025-03-13 DIAGNOSIS — R79.9 ELEVATED BUN: ICD-10-CM

## 2025-03-13 DIAGNOSIS — E78.5 HYPERLIPIDEMIA ASSOCIATED WITH TYPE 2 DIABETES MELLITUS (MULTI): ICD-10-CM

## 2025-03-17 ENCOUNTER — APPOINTMENT (OUTPATIENT)
Dept: PRIMARY CARE | Facility: CLINIC | Age: 60
End: 2025-03-17
Payer: COMMERCIAL

## 2025-03-17 VITALS
OXYGEN SATURATION: 96 % | RESPIRATION RATE: 16 BRPM | HEART RATE: 85 BPM | WEIGHT: 162.2 LBS | HEIGHT: 67 IN | DIASTOLIC BLOOD PRESSURE: 68 MMHG | BODY MASS INDEX: 25.46 KG/M2 | SYSTOLIC BLOOD PRESSURE: 135 MMHG

## 2025-03-17 DIAGNOSIS — H73.893 RETRACTED TYMPANIC MEMBRANE, BILATERAL: ICD-10-CM

## 2025-03-17 DIAGNOSIS — E11.69 HYPERLIPIDEMIA ASSOCIATED WITH TYPE 2 DIABETES MELLITUS: ICD-10-CM

## 2025-03-17 DIAGNOSIS — Z23 NEED FOR DIPHTHERIA-TETANUS-PERTUSSIS (TDAP) VACCINE: ICD-10-CM

## 2025-03-17 DIAGNOSIS — E27.9 ADRENAL NODULE: ICD-10-CM

## 2025-03-17 DIAGNOSIS — J30.2 SEASONAL ALLERGIC RHINITIS, UNSPECIFIED TRIGGER: ICD-10-CM

## 2025-03-17 DIAGNOSIS — E78.5 HYPERLIPIDEMIA ASSOCIATED WITH TYPE 2 DIABETES MELLITUS: ICD-10-CM

## 2025-03-17 DIAGNOSIS — Z79.4 TYPE 2 DIABETES MELLITUS WITH HYPERGLYCEMIA, WITH LONG-TERM CURRENT USE OF INSULIN: ICD-10-CM

## 2025-03-17 DIAGNOSIS — Z00.00 ENCOUNTER FOR ANNUAL PHYSICAL EXAM: Primary | ICD-10-CM

## 2025-03-17 DIAGNOSIS — E11.65 TYPE 2 DIABETES MELLITUS WITH HYPERGLYCEMIA, WITH LONG-TERM CURRENT USE OF INSULIN: ICD-10-CM

## 2025-03-17 DIAGNOSIS — H10.10 SEASONAL ALLERGIC CONJUNCTIVITIS: ICD-10-CM

## 2025-03-17 DIAGNOSIS — E11.9 ENCOUNTER FOR DIABETIC FOOT EXAM (MULTI): ICD-10-CM

## 2025-03-17 DIAGNOSIS — I10 PRIMARY HYPERTENSION: ICD-10-CM

## 2025-03-17 LAB
ANION GAP SERPL CALCULATED.4IONS-SCNC: 9 MMOL/L (CALC) (ref 7–17)
BUN SERPL-MCNC: 18 MG/DL (ref 7–25)
BUN/CREAT SERPL: ABNORMAL (CALC) (ref 6–22)
CALCIUM SERPL-MCNC: 9.4 MG/DL (ref 8.6–10.4)
CHLORIDE SERPL-SCNC: 104 MMOL/L (ref 98–110)
CO2 SERPL-SCNC: 28 MMOL/L (ref 20–32)
CREAT SERPL-MCNC: 0.71 MG/DL (ref 0.5–1.03)
EGFRCR SERPLBLD CKD-EPI 2021: 98 ML/MIN/1.73M2
GLUCOSE SERPL-MCNC: 138 MG/DL (ref 65–99)
POTASSIUM SERPL-SCNC: 4.2 MMOL/L (ref 3.5–5.3)
SODIUM SERPL-SCNC: 141 MMOL/L (ref 135–146)

## 2025-03-17 PROCEDURE — 4010F ACE/ARB THERAPY RXD/TAKEN: CPT | Performed by: INTERNAL MEDICINE

## 2025-03-17 PROCEDURE — 3078F DIAST BP <80 MM HG: CPT | Performed by: INTERNAL MEDICINE

## 2025-03-17 PROCEDURE — 3008F BODY MASS INDEX DOCD: CPT | Performed by: INTERNAL MEDICINE

## 2025-03-17 PROCEDURE — 90471 IMMUNIZATION ADMIN: CPT | Performed by: INTERNAL MEDICINE

## 2025-03-17 PROCEDURE — 3075F SYST BP GE 130 - 139MM HG: CPT | Performed by: INTERNAL MEDICINE

## 2025-03-17 PROCEDURE — 99396 PREV VISIT EST AGE 40-64: CPT | Performed by: INTERNAL MEDICINE

## 2025-03-17 PROCEDURE — 90715 TDAP VACCINE 7 YRS/> IM: CPT | Performed by: INTERNAL MEDICINE

## 2025-03-17 RX ORDER — MOMETASONE FUROATE MONOHYDRATE 50 UG/1
1 SPRAY, METERED NASAL 2 TIMES DAILY
Refills: 11
Start: 2025-03-17 | End: 2026-03-17

## 2025-03-17 RX ORDER — AZELASTINE HYDROCHLORIDE 0.5 MG/ML
1 SOLUTION/ DROPS OPHTHALMIC 2 TIMES DAILY PRN
Qty: 6 ML | Refills: 1 | Status: SHIPPED | OUTPATIENT
Start: 2025-03-17 | End: 2026-03-17

## 2025-03-17 ASSESSMENT — PATIENT HEALTH QUESTIONNAIRE - PHQ9
SUM OF ALL RESPONSES TO PHQ9 QUESTIONS 1 AND 2: 0
1. LITTLE INTEREST OR PLEASURE IN DOING THINGS: NOT AT ALL
2. FEELING DOWN, DEPRESSED OR HOPELESS: NOT AT ALL

## 2025-03-17 ASSESSMENT — PAIN SCALES - GENERAL: PAINLEVEL_OUTOF10: 0-NO PAIN

## 2025-03-17 NOTE — PATIENT INSTRUCTIONS
Consider trying mounjaro samples-could use scopolamine patch when taking med if it causes nausea. Please let me know if you start these/how it goes.    Start taking losartan 25 mg daily. Hydrate well.   Goal bp is under 130/80.  Let me know if you want to try olmesartan low dose instead.  Recommend omron bp cuff with AC adapter.  Home monitor bp.    Allergic rhinitis recommend nasonex/mometasone nasal spray 1  spray each nostril twice daily  for at least  a month, see if helps your hearing, ears.  Optivar eye drops as needed for allergic conjunctivitis    Suggest hearing evaluation/audiometry.    Please do self breast examination.  Consider appointment for pap test for cervical cancer screening.    Blood test today or soon for bmp and A1c .    Follow up 6 months sooner if needed. Fasting blood test before that visit (complete).    TDAP vaccine given today, next routine booster in 10 years.  Would do an A1c at 3 months however.

## 2025-03-18 LAB
EST. AVERAGE GLUCOSE BLD GHB EST-MCNC: 180 MG/DL
EST. AVERAGE GLUCOSE BLD GHB EST-SCNC: 10 MMOL/L
HBA1C MFR BLD: 7.9 % OF TOTAL HGB

## 2025-03-30 DIAGNOSIS — I10 PRIMARY HYPERTENSION: ICD-10-CM

## 2025-03-30 DIAGNOSIS — Z79.899 ENCOUNTER FOR MONITORING STATIN THERAPY: ICD-10-CM

## 2025-03-30 DIAGNOSIS — Z79.4 TYPE 2 DIABETES MELLITUS WITH HYPERGLYCEMIA, WITH LONG-TERM CURRENT USE OF INSULIN: ICD-10-CM

## 2025-03-30 DIAGNOSIS — R79.89 TSH ELEVATION: ICD-10-CM

## 2025-03-30 DIAGNOSIS — E55.9 VITAMIN D INSUFFICIENCY: ICD-10-CM

## 2025-03-30 DIAGNOSIS — E53.8 B12 DEFICIENCY: ICD-10-CM

## 2025-03-30 DIAGNOSIS — Z51.81 ENCOUNTER FOR MONITORING STATIN THERAPY: ICD-10-CM

## 2025-03-30 DIAGNOSIS — E78.00 HYPERCHOLESTEREMIA: ICD-10-CM

## 2025-03-30 DIAGNOSIS — Z00.00 BLOOD TESTS FOR ROUTINE GENERAL PHYSICAL EXAMINATION: Primary | ICD-10-CM

## 2025-03-30 DIAGNOSIS — E11.65 TYPE 2 DIABETES MELLITUS WITH HYPERGLYCEMIA, WITH LONG-TERM CURRENT USE OF INSULIN: ICD-10-CM

## 2025-08-18 ENCOUNTER — TELEPHONE (OUTPATIENT)
Dept: PRIMARY CARE | Facility: CLINIC | Age: 60
End: 2025-08-18
Payer: COMMERCIAL

## 2025-10-13 ENCOUNTER — APPOINTMENT (OUTPATIENT)
Dept: PRIMARY CARE | Facility: CLINIC | Age: 60
End: 2025-10-13
Payer: COMMERCIAL